# Patient Record
Sex: FEMALE | Race: WHITE | NOT HISPANIC OR LATINO | URBAN - METROPOLITAN AREA
[De-identification: names, ages, dates, MRNs, and addresses within clinical notes are randomized per-mention and may not be internally consistent; named-entity substitution may affect disease eponyms.]

---

## 2019-04-10 ENCOUNTER — INPATIENT (INPATIENT)
Facility: HOSPITAL | Age: 19
LOS: 2 days | Discharge: HOME | End: 2019-04-13
Attending: OBSTETRICS & GYNECOLOGY | Admitting: OBSTETRICS & GYNECOLOGY
Payer: MEDICAID

## 2019-04-11 ENCOUNTER — RESULT REVIEW (OUTPATIENT)
Age: 19
End: 2019-04-11

## 2019-04-11 VITALS
SYSTOLIC BLOOD PRESSURE: 126 MMHG | RESPIRATION RATE: 16 BRPM | DIASTOLIC BLOOD PRESSURE: 77 MMHG | HEART RATE: 96 BPM | TEMPERATURE: 99 F

## 2019-04-11 LAB
AMPHET UR-MCNC: NEGATIVE — SIGNIFICANT CHANGE UP
APPEARANCE UR: ABNORMAL
BACTERIA # UR AUTO: ABNORMAL /HPF
BARBITURATES UR SCN-MCNC: NEGATIVE — SIGNIFICANT CHANGE UP
BASOPHILS # BLD AUTO: 0.02 K/UL — SIGNIFICANT CHANGE UP (ref 0–0.2)
BASOPHILS NFR BLD AUTO: 0.2 % — SIGNIFICANT CHANGE UP (ref 0–1)
BENZODIAZ UR-MCNC: NEGATIVE — SIGNIFICANT CHANGE UP
BILIRUB UR-MCNC: NEGATIVE — SIGNIFICANT CHANGE UP
BLD GP AB SCN SERPL QL: SIGNIFICANT CHANGE UP
BUPRENORPHINE SCREEN, URINE RESULT: NEGATIVE — SIGNIFICANT CHANGE UP
COCAINE METAB.OTHER UR-MCNC: NEGATIVE — SIGNIFICANT CHANGE UP
COLOR SPEC: YELLOW — SIGNIFICANT CHANGE UP
DIFF PNL FLD: ABNORMAL
EOSINOPHIL # BLD AUTO: 0.05 K/UL — SIGNIFICANT CHANGE UP (ref 0–0.7)
EOSINOPHIL NFR BLD AUTO: 0.4 % — SIGNIFICANT CHANGE UP (ref 0–8)
EPI CELLS # UR: ABNORMAL /HPF
GLUCOSE UR QL: NEGATIVE MG/DL — SIGNIFICANT CHANGE UP
HCT VFR BLD CALC: 38.1 % — SIGNIFICANT CHANGE UP (ref 37–47)
HGB BLD-MCNC: 12.3 G/DL — SIGNIFICANT CHANGE UP (ref 12–16)
IMM GRANULOCYTES NFR BLD AUTO: 0.4 % — HIGH (ref 0.1–0.3)
KETONES UR-MCNC: NEGATIVE — SIGNIFICANT CHANGE UP
L&D DRUG SCREEN, URINE: SIGNIFICANT CHANGE UP
LEUKOCYTE ESTERASE UR-ACNC: ABNORMAL
LYMPHOCYTES # BLD AUTO: 29.1 % — SIGNIFICANT CHANGE UP (ref 20.5–51.1)
LYMPHOCYTES # BLD AUTO: 3.29 K/UL — SIGNIFICANT CHANGE UP (ref 1.2–3.4)
MCHC RBC-ENTMCNC: 29.1 PG — SIGNIFICANT CHANGE UP (ref 27–31)
MCHC RBC-ENTMCNC: 32.3 G/DL — SIGNIFICANT CHANGE UP (ref 32–37)
MCV RBC AUTO: 90.3 FL — SIGNIFICANT CHANGE UP (ref 81–99)
METHADONE UR-MCNC: NEGATIVE — SIGNIFICANT CHANGE UP
MONOCYTES # BLD AUTO: 0.72 K/UL — HIGH (ref 0.1–0.6)
MONOCYTES NFR BLD AUTO: 6.4 % — SIGNIFICANT CHANGE UP (ref 1.7–9.3)
NEUTROPHILS # BLD AUTO: 7.17 K/UL — HIGH (ref 1.4–6.5)
NEUTROPHILS NFR BLD AUTO: 63.5 % — SIGNIFICANT CHANGE UP (ref 42.2–75.2)
NITRITE UR-MCNC: NEGATIVE — SIGNIFICANT CHANGE UP
NRBC # BLD: 0 /100 WBCS — SIGNIFICANT CHANGE UP (ref 0–0)
OPIATES UR-MCNC: NEGATIVE — SIGNIFICANT CHANGE UP
OXYCODONE UR-MCNC: NEGATIVE — SIGNIFICANT CHANGE UP
PCP UR-MCNC: NEGATIVE — SIGNIFICANT CHANGE UP
PH UR: 7.5 — SIGNIFICANT CHANGE UP (ref 5–8)
PLATELET # BLD AUTO: 161 K/UL — SIGNIFICANT CHANGE UP (ref 130–400)
PRENATAL SYPHILIS TEST: SIGNIFICANT CHANGE UP
PROPOXYPHENE QUALITATIVE URINE RESULT: NEGATIVE — SIGNIFICANT CHANGE UP
PROT UR-MCNC: ABNORMAL MG/DL
RBC # BLD: 4.22 M/UL — SIGNIFICANT CHANGE UP (ref 4.2–5.4)
RBC # FLD: 13.8 % — SIGNIFICANT CHANGE UP (ref 11.5–14.5)
RBC CASTS # UR COMP ASSIST: ABNORMAL /HPF
SP GR SPEC: 1.01 — SIGNIFICANT CHANGE UP (ref 1.01–1.03)
TYPE + AB SCN PNL BLD: SIGNIFICANT CHANGE UP
UROBILINOGEN FLD QL: 0.2 MG/DL — SIGNIFICANT CHANGE UP (ref 0.2–0.2)
WBC # BLD: 11.29 K/UL — HIGH (ref 4.8–10.8)
WBC # FLD AUTO: 11.29 K/UL — HIGH (ref 4.8–10.8)
WBC UR QL: ABNORMAL /HPF

## 2019-04-11 PROCEDURE — 59514 CESAREAN DELIVERY ONLY: CPT | Mod: U9

## 2019-04-11 PROCEDURE — 88307 TISSUE EXAM BY PATHOLOGIST: CPT | Mod: 26

## 2019-04-11 RX ORDER — IBUPROFEN 200 MG
600 TABLET ORAL EVERY 6 HOURS
Qty: 0 | Refills: 0 | Status: DISCONTINUED | OUTPATIENT
Start: 2019-04-11 | End: 2019-04-13

## 2019-04-11 RX ORDER — DIPHENHYDRAMINE HCL 50 MG
25 CAPSULE ORAL EVERY 6 HOURS
Qty: 0 | Refills: 0 | Status: DISCONTINUED | OUTPATIENT
Start: 2019-04-11 | End: 2019-04-13

## 2019-04-11 RX ORDER — AMPICILLIN TRIHYDRATE 250 MG
2 CAPSULE ORAL EVERY 6 HOURS
Qty: 0 | Refills: 0 | Status: DISCONTINUED | OUTPATIENT
Start: 2019-04-11 | End: 2019-04-12

## 2019-04-11 RX ORDER — NALOXONE HYDROCHLORIDE 4 MG/.1ML
0.1 SPRAY NASAL
Qty: 0 | Refills: 0 | Status: DISCONTINUED | OUTPATIENT
Start: 2019-04-11 | End: 2019-04-13

## 2019-04-11 RX ORDER — ONDANSETRON 8 MG/1
4 TABLET, FILM COATED ORAL EVERY 6 HOURS
Qty: 0 | Refills: 0 | Status: DISCONTINUED | OUTPATIENT
Start: 2019-04-11 | End: 2019-04-13

## 2019-04-11 RX ORDER — MORPHINE SULFATE 50 MG/1
3 CAPSULE, EXTENDED RELEASE ORAL ONCE
Qty: 0 | Refills: 0 | Status: DISCONTINUED | OUTPATIENT
Start: 2019-04-11 | End: 2019-04-13

## 2019-04-11 RX ORDER — LANOLIN
1 OINTMENT (GRAM) TOPICAL
Qty: 0 | Refills: 0 | Status: DISCONTINUED | OUTPATIENT
Start: 2019-04-11 | End: 2019-04-13

## 2019-04-11 RX ORDER — GLYCERIN ADULT
1 SUPPOSITORY, RECTAL RECTAL AT BEDTIME
Qty: 0 | Refills: 0 | Status: DISCONTINUED | OUTPATIENT
Start: 2019-04-11 | End: 2019-04-13

## 2019-04-11 RX ORDER — FENTANYL/BUPIVACAINE/NS/PF 2MCG/ML-.1
250 PLASTIC BAG, INJECTION (ML) INJECTION
Qty: 0 | Refills: 0 | Status: DISCONTINUED | OUTPATIENT
Start: 2019-04-11 | End: 2019-04-13

## 2019-04-11 RX ORDER — SIMETHICONE 80 MG/1
80 TABLET, CHEWABLE ORAL EVERY 4 HOURS
Qty: 0 | Refills: 0 | Status: DISCONTINUED | OUTPATIENT
Start: 2019-04-11 | End: 2019-04-13

## 2019-04-11 RX ORDER — SODIUM CHLORIDE 9 MG/ML
1000 INJECTION, SOLUTION INTRAVENOUS
Qty: 0 | Refills: 0 | Status: DISCONTINUED | OUTPATIENT
Start: 2019-04-11 | End: 2019-04-12

## 2019-04-11 RX ORDER — ACETAMINOPHEN 500 MG
650 TABLET ORAL ONCE
Qty: 0 | Refills: 0 | Status: COMPLETED | OUTPATIENT
Start: 2019-04-11 | End: 2019-04-11

## 2019-04-11 RX ORDER — AMPICILLIN TRIHYDRATE 250 MG
2 CAPSULE ORAL EVERY 6 HOURS
Qty: 0 | Refills: 0 | Status: DISCONTINUED | OUTPATIENT
Start: 2019-04-11 | End: 2019-04-11

## 2019-04-11 RX ORDER — AMPICILLIN TRIHYDRATE 250 MG
2 CAPSULE ORAL ONCE
Qty: 0 | Refills: 0 | Status: COMPLETED | OUTPATIENT
Start: 2019-04-11 | End: 2019-04-11

## 2019-04-11 RX ORDER — OXYCODONE HYDROCHLORIDE 5 MG/1
5 TABLET ORAL
Qty: 0 | Refills: 0 | Status: DISCONTINUED | OUTPATIENT
Start: 2019-04-11 | End: 2019-04-13

## 2019-04-11 RX ORDER — HYDROMORPHONE HYDROCHLORIDE 2 MG/ML
1 INJECTION INTRAMUSCULAR; INTRAVENOUS; SUBCUTANEOUS
Qty: 0 | Refills: 0 | Status: DISCONTINUED | OUTPATIENT
Start: 2019-04-11 | End: 2019-04-13

## 2019-04-11 RX ORDER — OXYTOCIN 10 UNIT/ML
41.67 VIAL (ML) INJECTION
Qty: 20 | Refills: 0 | Status: DISCONTINUED | OUTPATIENT
Start: 2019-04-11 | End: 2019-04-11

## 2019-04-11 RX ORDER — OXYCODONE AND ACETAMINOPHEN 5; 325 MG/1; MG/1
2 TABLET ORAL EVERY 6 HOURS
Qty: 0 | Refills: 0 | Status: DISCONTINUED | OUTPATIENT
Start: 2019-04-11 | End: 2019-04-13

## 2019-04-11 RX ORDER — AMPICILLIN TRIHYDRATE 250 MG
CAPSULE ORAL
Qty: 0 | Refills: 0 | Status: DISCONTINUED | OUTPATIENT
Start: 2019-04-11 | End: 2019-04-11

## 2019-04-11 RX ORDER — KETOROLAC TROMETHAMINE 30 MG/ML
30 SYRINGE (ML) INJECTION ONCE
Qty: 0 | Refills: 0 | Status: DISCONTINUED | OUTPATIENT
Start: 2019-04-11 | End: 2019-04-13

## 2019-04-11 RX ORDER — GENTAMICIN SULFATE 40 MG/ML
80 VIAL (ML) INJECTION EVERY 8 HOURS
Qty: 0 | Refills: 0 | Status: DISCONTINUED | OUTPATIENT
Start: 2019-04-11 | End: 2019-04-11

## 2019-04-11 RX ORDER — SODIUM CHLORIDE 9 MG/ML
1000 INJECTION, SOLUTION INTRAVENOUS ONCE
Qty: 0 | Refills: 0 | Status: DISCONTINUED | OUTPATIENT
Start: 2019-04-11 | End: 2019-04-11

## 2019-04-11 RX ORDER — FERROUS SULFATE 325(65) MG
325 TABLET ORAL DAILY
Qty: 0 | Refills: 0 | Status: DISCONTINUED | OUTPATIENT
Start: 2019-04-11 | End: 2019-04-13

## 2019-04-11 RX ORDER — ENOXAPARIN SODIUM 100 MG/ML
40 INJECTION SUBCUTANEOUS AT BEDTIME
Qty: 0 | Refills: 0 | Status: DISCONTINUED | OUTPATIENT
Start: 2019-04-12 | End: 2019-04-13

## 2019-04-11 RX ORDER — ENOXAPARIN SODIUM 100 MG/ML
40 INJECTION SUBCUTANEOUS ONCE
Qty: 0 | Refills: 0 | Status: COMPLETED | OUTPATIENT
Start: 2019-04-12 | End: 2019-04-12

## 2019-04-11 RX ORDER — GENTAMICIN SULFATE 40 MG/ML
80 VIAL (ML) INJECTION EVERY 8 HOURS
Qty: 0 | Refills: 0 | Status: DISCONTINUED | OUTPATIENT
Start: 2019-04-11 | End: 2019-04-12

## 2019-04-11 RX ORDER — OXYCODONE AND ACETAMINOPHEN 5; 325 MG/1; MG/1
1 TABLET ORAL
Qty: 0 | Refills: 0 | Status: DISCONTINUED | OUTPATIENT
Start: 2019-04-11 | End: 2019-04-13

## 2019-04-11 RX ORDER — OXYTOCIN 10 UNIT/ML
41.67 VIAL (ML) INJECTION
Qty: 20 | Refills: 0 | Status: DISCONTINUED | OUTPATIENT
Start: 2019-04-11 | End: 2019-04-13

## 2019-04-11 RX ORDER — SODIUM CHLORIDE 9 MG/ML
1000 INJECTION, SOLUTION INTRAVENOUS
Qty: 0 | Refills: 0 | Status: DISCONTINUED | OUTPATIENT
Start: 2019-04-11 | End: 2019-04-11

## 2019-04-11 RX ORDER — OXYCODONE HYDROCHLORIDE 5 MG/1
10 TABLET ORAL
Qty: 0 | Refills: 0 | Status: DISCONTINUED | OUTPATIENT
Start: 2019-04-11 | End: 2019-04-13

## 2019-04-11 RX ORDER — CEFAZOLIN SODIUM 1 G
2000 VIAL (EA) INJECTION ONCE
Qty: 0 | Refills: 0 | Status: DISCONTINUED | OUTPATIENT
Start: 2019-04-11 | End: 2019-04-11

## 2019-04-11 RX ORDER — DOCUSATE SODIUM 100 MG
100 CAPSULE ORAL
Qty: 0 | Refills: 0 | Status: DISCONTINUED | OUTPATIENT
Start: 2019-04-11 | End: 2019-04-13

## 2019-04-11 RX ORDER — ACETAMINOPHEN 500 MG
650 TABLET ORAL EVERY 6 HOURS
Qty: 0 | Refills: 0 | Status: DISCONTINUED | OUTPATIENT
Start: 2019-04-11 | End: 2019-04-13

## 2019-04-11 RX ADMIN — Medication 216 GRAM(S): at 18:44

## 2019-04-11 RX ADMIN — Medication 104 MILLIGRAM(S): at 10:28

## 2019-04-11 RX ADMIN — Medication 650 MILLIGRAM(S): at 10:17

## 2019-04-11 RX ADMIN — SODIUM CHLORIDE 125 MILLILITER(S): 9 INJECTION, SOLUTION INTRAVENOUS at 18:10

## 2019-04-11 RX ADMIN — Medication 104 MILLIGRAM(S): at 19:25

## 2019-04-11 RX ADMIN — Medication 100 MILLIGRAM(S): at 17:40

## 2019-04-11 RX ADMIN — ONDANSETRON 4 MILLIGRAM(S): 8 TABLET, FILM COATED ORAL at 15:39

## 2019-04-11 RX ADMIN — Medication 216 GRAM(S): at 10:21

## 2019-04-11 NOTE — BRIEF OPERATIVE NOTE - OPERATION/FINDINGS
Live female infant delivered in cephalic presentation, 40w1d, clear amniontic fluid, APGARs 9/9, bwt 3830g. Normal appearing uterus, fallopian tubes and ovaries bilaterally. 3x4cm hematoma at the right superior edge of the uterine incision, soft and non-expanding. Interceed placed.

## 2019-04-11 NOTE — OB PROVIDER TRIAGE NOTE - NSOBPROVIDERNOTE_OBGYN_ALL_OB_FT
17 yo  at 40w1d GA, GBS negative, in early labor, BPP 10/10, desires ambulation  -ambulate x2 hours, instructions and precautions given  -re-evaluate on return to L&D    Discussed with Dr. Voss and Dr. Fulton

## 2019-04-11 NOTE — OB PROVIDER H&P - ATTENDING COMMENTS
17 yo  at 40w1d GA, GBS negative, in labor, s/p epidural , w/ auscultated fetal arrythmia  Arom done clear fluid.    -follow fhr pattern,   -l lateral position  -oxygen via facemask

## 2019-04-11 NOTE — OB PROVIDER H&P - ASSESSMENT
17 yo  at 40w1d GA, GBS negative, in labor, desiring epidural  Admit to L&D  Continuous EFM and toco  IV fluids  Admission labs  Clear liquid diet  epidural for pain managment    Discussed with Dr. Voss and Dr. Fulton

## 2019-04-11 NOTE — PROGRESS NOTE ADULT - SUBJECTIVE AND OBJECTIVE BOX
Chief Complaint: Postpartum    HPI: Pt doing well, pain well controlled. No acute complaints. NPO for now, not yet ambulating, Addison in place. No flatus or BM yet. Breastfeeding.     ROS: Denies cardiovascular or respiratory symptoms    PAST MEDICAL & SURGICAL HISTORY:  No pertinent past medical history  No significant past surgical history    Physical Exam  Vital Signs Last 24 Hrs  T(F): 97.3 (2019 14:45), Max: 101.3 (2019 10:12)  HR: 86 (2019 14:45) (46 - 106)  BP: 105/62 (2019 14:45) (96/60 - 140/68)  RR: 20 (2019 14:45) (16 - 20)    Physical exam:  General - AAOx3, NAD  Heart - S1S2, RRR  Lungs - CTA BL  Abdomen:  - Soft, nontender, nondistended, BS+, dressing intact, dry and clean   - Fundus firm, moderately tender  Pelvis/Vagina - Normal Lochia  Extremities: No calf tenderness, no swelling bilaterally   Urine output: 50cc/hr ([id number]) - adequate as min 43cc/hr     Labs:                        12.3   11.29 )-----------( 161      ( 2019 03:00 )             38.1     Type + Screen: O POS (19 @ 03:00)  Antibody Screen: NEG (19 @ 03:00)    Prenatal Syphilis Test: Nonreact (19 @ 03:00)    Assessment:   17 yo now , s/p primary  for NRFHR, POD#0, w/ 3x4cm hematoma right superior, w/ chorioamnionitis, temp 101.4F @1012, since then no elevated temps, on amp/gent/clinda now, recovering well,     Plan:  -Routine postpartum care  -Encourage ambulation after Addison comes out  -D/c Addison at around 0300 when duramorph ends or in the AM if UO still adequate  -f/u AM labs  -Lovenox and Ancef as ordered  -Advance diet to clear liquid at around 1900  -Encourage out of bed to chair at around 1900  -Needs PP MMR    Dr. Jacob and Dr. Trujillo to be made aware. Chief Complaint: Postpartum    HPI: Pt doing well, pain well controlled. No acute complaints. NPO for now, not yet ambulating, Addison in place. No flatus or BM yet. Breastfeeding.     ROS: Denies cardiovascular or respiratory symptoms    PAST MEDICAL & SURGICAL HISTORY:  No pertinent past medical history  No significant past surgical history    Physical Exam  Vital Signs Last 24 Hrs  T(F): 97.3 (2019 14:45), Max: 101.3 (2019 10:12)  HR: 86 (2019 14:45) (46 - 106)  BP: 105/62 (2019 14:45) (96/60 - 140/68)  RR: 20 (2019 14:45) (16 - 20)    Physical exam:  General - AAOx3, NAD  Heart - S1S2, RRR  Lungs - CTA BL  Abdomen:  - Soft, nontender, nondistended, BS+, dressing intact, dry and clean   - Fundus firm, moderately tender  Pelvis/Vagina - Normal Lochia  Extremities: No calf tenderness, no swelling bilaterally   Urine output: 50cc/hr ([id number]) - adequate as min 43cc/hr     Labs:                        12.3   11.29 )-----------( 161      ( 2019 03:00 )             38.1     Type + Screen: O POS (19 @ 03:00)  Antibody Screen: NEG (19 @ 03:00)    Prenatal Syphilis Test: Nonreact (19 @ 03:00)    Assessment:   19 yo now , s/p primary  for NRFHR, POD#0, w/ 3x4cm hematoma right superior, w/ chorioamnionitis, temp 101.4F @1012, since then no elevated temps, on amp/gent/clinda now, recovering well,     Plan:  -Routine postpartum care  -Encourage ambulation after Addison comes out  -D/c Addison at around 0300 when duramorph ends or in the AM if UO still adequate  -f/u AM labs  -Lovenox and Ancef as ordered  -Advance diet to clear liquid at around 1900  -Encourage out of bed to chair at around 1900  -Needs PP MMR  -Pain management prn  -Encourage spirometry use     Dr. Jacob and Dr. Trujillo to be made aware.

## 2019-04-11 NOTE — PROCEDURE NOTE - NSLOADDOSE_OBGYN_ALL_OB
10 ML of 0.125 percent Bupivicaine Continuous Bupivicaine 0.1 percent/10 ML of 0.125 percent Bupivicaine

## 2019-04-11 NOTE — OB PROVIDER TRIAGE NOTE - NSHPPHYSICALEXAM_GEN_ALL_CORE
Vital Signs Last 24 Hrs  T(F): 98.6 (11 Apr 2019 00:04), Max: 98.6 (11 Apr 2019 00:04)  HR: 96 (11 Apr 2019 00:08) (96 - 96)  BP: 126/77 (11 Apr 2019 00:08) (126/77 - 126/77)  RR: 16 (11 Apr 2019 00:04) (16 - 16)    EFM: category 1, reactive NST  [place]: q5min    Gen: AAOx3, NAD  Abd: Gravid, soft, nontender, moderate intensity palpable contractions

## 2019-04-11 NOTE — OB PROVIDER H&P - HISTORY OF PRESENT ILLNESS
17 yo  at 40w1d GA by first trimester sono presents for contractions since early afternoon, every 5 minutes, moderate intensity. She denies leakage of fluid or vaginal bleeding. She feels regular fetal movement. Last examined in office this morning, 2cm dilated.    She returned s/p ambulation with increased contraction pain, desiring epidural. She continues to deny leakage of fluid or vaginal bleeding. She still feels regular fetal movement.

## 2019-04-11 NOTE — OB PROVIDER H&P - NSHPPHYSICALEXAM_GEN_ALL_CORE
Vital Signs Last 24 Hrs  T(F): 98.6 (11 Apr 2019 00:04), Max: 98.6 (11 Apr 2019 00:04)  HR: 93 (11 Apr 2019 02:48) (93 - 96)  BP: 131/90 (11 Apr 2019 02:48) (126/77 - 131/90)  RR: 16 (11 Apr 2019 00:04) (16 - 16)    EFM: 140/mod/accels  [place]:q5min    Gen: AAOx3, NAD  Abd: Gravid, soft, nontender, palpable contractions

## 2019-04-11 NOTE — BRIEF OPERATIVE NOTE - NSICDXBRIEFPOSTOP_GEN_ALL_CORE_FT
POST-OP DIAGNOSIS:  Term pregnancy delivered 11-Apr-2019 11:59:23  Xi Garcia  Chorioamnionitis 11-Apr-2019 11:59:16  Xi Garcia  Non-reassuring fetal heart rate with late deceleration 11-Apr-2019 11:59:07  Xi Garcia

## 2019-04-11 NOTE — BRIEF OPERATIVE NOTE - COMMENTS
Uncomplicated    Dictation: [id number] Uncomplicated  patient underwent uncomplicated primary LFT  as above, normal adnexae, tolerated well  Dictation: [id number]

## 2019-04-11 NOTE — PROGRESS NOTE ADULT - SUBJECTIVE AND OBJECTIVE BOX
PGY 3 Note:    Patient with cat 2 tracing, recurrent late decelerations. Patient now with temp 101.4F at 1012, fetal tachycardia, dx with chorioamnionitis now. Discussed primary  section with the patient for Cat 2 tracing, informed consent obtained. Will start amp/gent/clinda now. Will open OR, prep skin, move patient to OR, alert Anesthesia and Peds teams.    Dr. Voss aware PGY 3 Note:    Patient with cat 2 tracing, recurrent late decelerations. Patient now with temp 101.4F at 1012, fetal tachycardia (/mnod/no accels, recurrent late decels), no pitocin was evergiven, dx with chorioamnionitis now, patient still 6cm since 0930, check again at 1020, still 6cm. Discussed primary  section with the patient for Cat 2 tracing remote from delivery started at 0900, informed consent obtained. Will start amp/gent/clinda now. Will open OR, prep skin, move patient to OR, alert Anesthesia and Peds teams.    Dr. Voss aware PGY 3 Note:    Patient with cat 2 tracing, recurrent late decelerations. Patient now with temp 101.4F at 1012, fetal tachycardia (/mnod/no accels, recurrent late decels), no pitocin was evergiven, dx with chorioamnionitis now, patient still 6cm since 0930, check again at 1020, still 6cm. Discussed primary  section with the patient for Cat 2 tracing remote from delivery started at 0900, informed consent obtained. Will start amp/gent/clinda now. Will open OR, prep skin, move patient to OR, alert Anesthesia and Peds teams.    Dr. Voss aware    Ob Attg:   Para 0 at 40.1 wks, in labor, now w/ worsening category 2 fhr tracing, maternal temperature 101.5, remote from delivery. R/B/A discussed w/ patient  Consent signed.  Dr Trujillo now on labor floor will proceed to  section.

## 2019-04-11 NOTE — OB PROVIDER TRIAGE NOTE - HISTORY OF PRESENT ILLNESS
19 yo  at 40w1d GA by LMP presents for contractions since early afternoon, every 5 minutes, moderate intensity. She denies leakage of fluid or vaginal bleeding. She feels regular fetal movement. Last examined in office this morning, 2cm dilated. 17 yo  at 40w1d GA by first trimester sono presents for contractions since early afternoon, every 5 minutes, moderate intensity. She denies leakage of fluid or vaginal bleeding. She feels regular fetal movement. Last examined in office this morning, 2cm dilated.

## 2019-04-11 NOTE — PROCEDURE NOTE - SUPERVISORY STATEMENT
Epidural Infusion:  0.1% Bupivacaine PLAIN Rate:  15 ml/hr  Sensory:  T8 (Rt = Mid = Lt)  Motor intact, able to flex b/l knees w/o any difficulty  Pain pre = "8", po = "2"  Pt does not have any questions.

## 2019-04-11 NOTE — PRE-ANESTHESIA EVALUATION ADULT - NSANTHOSAYNRD_GEN_A_CORE
No. [place] screening performed.  STOP BANG Legend: 0-2 = LOW Risk; 3-4 = INTERMEDIATE Risk; 5-8 = HIGH Risk

## 2019-04-11 NOTE — PROCEDURE NOTE - ADDITIONAL PROCEDURE DETAILS
REDOSE  Pain:  V/E  Time: 0630  Medication: Lidocaine 1.5% 5xx  Given in increments after aspiration  VSS analgesia at T10 REDOSE  Pain:  V/E  Time: 0630    Patient transferred to OR for C/S at 1035  Indication for C/S Cat 2 FHR fetal stress  Medication: Lidocaine 1.5% 5xx  Given in increments after aspiration  VSS analgesia at T10 REDOSE  Pain:  V/E  Time: 0630  Medication: Lidocaine 1.5% 5cc  Given in increments after aspiration  VSS analgesia at T10    Patient transferred to OR for C/S at 1035  Indication for C/S Cat 2 FHR fetal stress

## 2019-04-11 NOTE — CHART NOTE - NSCHARTNOTEFT_GEN_A_CORE
Thursday 04/11/2019  05:26 AM    Patient doing well s/p Labor Epidural.  Hand-off report to be given to AM Anesthesia Team at 06:00 AM.

## 2019-04-11 NOTE — BRIEF OPERATIVE NOTE - NSICDXBRIEFPREOP_GEN_ALL_CORE_FT
PRE-OP DIAGNOSIS:  Term pregnancy 11-Apr-2019 11:59:37  Xi Garcia  Chorioamnionitis 11-Apr-2019 11:58:51  Xi Garcia  Non-reassuring fetal heart rate with late deceleration 11-Apr-2019 11:58:39  Xi Garcia

## 2019-04-11 NOTE — PATIENT PROFILE, NEWBORN NICU. - PRO PRENATAL LABS ORI SOURCE RUBELLA
Contacted [facility] to obtain EEG records.  Stated they would fax the information.      Boone Will  03/18/18 [id number]     hard copy, drawn during this pregnancy

## 2019-04-11 NOTE — OB PROVIDER H&P - NSHPLABSRESULTS_GEN_ALL_CORE
Sonos:  11/26/18 breech, anterior placenta, cervix 3.92cm, MVP 4.1, normal anatomic survey, suboptimal face and cardiac views

## 2019-04-11 NOTE — OB PROVIDER TRIAGE NOTE - NSHPLABSRESULTS_GEN_ALL_CORE
GBS negative 3/7/2019 GBS negative 3/7/2019  GCT 93    Sonos:  11/26/18 breech, anterior placenta, cervix 3.92cm, MVP 4.1, normal anatomic survey, suboptimal face and cardiac views

## 2019-04-11 NOTE — PROCEDURAL SAFETY CHECKLIST WITH OR WITHOUT SEDATION - NSPRESEDATION2FT_GEN_ALL_CORE
Pt presents to ER for evaluation of G-Tube displacement. Patient states that she was seen earlier in the week by her surgeon and was told that if her tube became dislodged that she needed to seek emergency attention. Patient states that she had hiatal hernia surgery and had a tube placed due to this. Patient states that the tube has come loose.    Anesthesia confirms case reviewed for anesthesia risk alert.

## 2019-04-11 NOTE — OB RN INTRAOPERATIVE NOTE - NS_ELECTROPADLOC_OBGYN_ALL_OB
How Severe Are Your Spot(S)?: moderate What Is The Reason For Today's Visit?: Full Body Skin Examination What Is The Reason For Today's Visit? (Being Monitored For X): concerning skin lesions on an annual basis Right thigh

## 2019-04-12 LAB
HCT VFR BLD CALC: 31.2 % — LOW (ref 37–47)
HGB BLD-MCNC: 9.9 G/DL — LOW (ref 12–16)
MCHC RBC-ENTMCNC: 28.9 PG — SIGNIFICANT CHANGE UP (ref 27–31)
MCHC RBC-ENTMCNC: 31.7 G/DL — LOW (ref 32–37)
MCV RBC AUTO: 91 FL — SIGNIFICANT CHANGE UP (ref 81–99)
NRBC # BLD: 0 /100 WBCS — SIGNIFICANT CHANGE UP (ref 0–0)
PLATELET # BLD AUTO: 122 K/UL — LOW (ref 130–400)
RBC # BLD: 3.43 M/UL — LOW (ref 4.2–5.4)
RBC # FLD: 14.4 % — SIGNIFICANT CHANGE UP (ref 11.5–14.5)
SURGICAL PATHOLOGY STUDY: SIGNIFICANT CHANGE UP
WBC # BLD: 14.2 K/UL — HIGH (ref 4.8–10.8)
WBC # FLD AUTO: 14.2 K/UL — HIGH (ref 4.8–10.8)

## 2019-04-12 RX ORDER — SODIUM CHLORIDE 9 MG/ML
3 INJECTION INTRAMUSCULAR; INTRAVENOUS; SUBCUTANEOUS EVERY 8 HOURS
Qty: 0 | Refills: 0 | Status: DISCONTINUED | OUTPATIENT
Start: 2019-04-12 | End: 2019-04-13

## 2019-04-12 RX ADMIN — ENOXAPARIN SODIUM 40 MILLIGRAM(S): 100 INJECTION SUBCUTANEOUS at 21:32

## 2019-04-12 RX ADMIN — Medication 216 GRAM(S): at 06:08

## 2019-04-12 RX ADMIN — Medication 100 MILLIGRAM(S): at 02:43

## 2019-04-12 RX ADMIN — SIMETHICONE 80 MILLIGRAM(S): 80 TABLET, CHEWABLE ORAL at 21:04

## 2019-04-12 RX ADMIN — Medication 216 GRAM(S): at 00:44

## 2019-04-12 RX ADMIN — Medication 600 MILLIGRAM(S): at 06:06

## 2019-04-12 RX ADMIN — Medication 100 MILLIGRAM(S): at 11:18

## 2019-04-12 RX ADMIN — Medication 650 MILLIGRAM(S): at 00:45

## 2019-04-12 RX ADMIN — Medication 600 MILLIGRAM(S): at 16:06

## 2019-04-12 RX ADMIN — Medication 104 MILLIGRAM(S): at 02:42

## 2019-04-12 RX ADMIN — SODIUM CHLORIDE 3 MILLILITER(S): 9 INJECTION INTRAMUSCULAR; INTRAVENOUS; SUBCUTANEOUS at 21:34

## 2019-04-12 RX ADMIN — Medication 650 MILLIGRAM(S): at 02:06

## 2019-04-12 RX ADMIN — Medication 104 MILLIGRAM(S): at 11:19

## 2019-04-12 RX ADMIN — Medication 100 MILLIGRAM(S): at 18:54

## 2019-04-12 RX ADMIN — Medication 600 MILLIGRAM(S): at 22:55

## 2019-04-12 RX ADMIN — ENOXAPARIN SODIUM 40 MILLIGRAM(S): 100 INJECTION SUBCUTANEOUS at 00:45

## 2019-04-12 RX ADMIN — Medication 600 MILLIGRAM(S): at 04:26

## 2019-04-12 NOTE — PROGRESS NOTE ADULT - SUBJECTIVE AND OBJECTIVE BOX
PGY 3 Note:    Pt doing well, pain well controlled. No acute complaints. Denies fever, chills, CP, SOB, severe abdominal pain, heavy vaginal bleeding, swelling.     Ambulating: Yes  Addison catheter in place  Flatus: No  Bowel movements: No  Breast or bottle feeding: Both  Diet: Clears    PAST MEDICAL & SURGICAL HISTORY:  No pertinent past medical history  No significant past surgical history      Physical Exam  Vital Signs Last 24 Hrs  T(F): 96.3 (2019 04:00), Max: 101.3 (2019 10:12)  HR: 91 (2019 04:00) (46 - 106)  BP: 110/61 (2019 04:00) (96/60 - 140/68)  RR: 18 (2019 04:00) (18 - 20)    Gen: AAOx3, NAD  Heart: S1S2, RRR  Lungs: CTABL  Abd: Soft, appropriately tender, mildly distended, BS+  Incision: Dressing in place, no saturation. Steri strips in place, incision c/d/i, no erythema/induration/drainage.  Fundus: Firm, appropriately tender, below the umbilicus  Lochia: Normal  Ext: No calf tenderness, no swelling, SCDs in place    Labs:                        12.3   11.29 )-----------( 161      ( 2019 03:00 )             38.1     Urinalysis Basic - ( 2019 03:00 )    Color: Yellow / Appearance: Cloudy / S.015 / pH: x  Gluc: x / Ketone: Negative  / Bili: Negative / Urobili: 0.2 mg/dL   Blood: x / Protein: Trace mg/dL / Nitrite: Negative   Leuk Esterase: Moderate / RBC: 26-50 /HPF / WBC 10-25 /HPF   Sq Epi: x / Non Sq Epi: Moderate /HPF / Bacteria: Moderate /HPF PGY 3 Note:    Pt doing well, pain well controlled. No acute complaints. Denies fever, chills, CP, SOB, severe abdominal pain, heavy vaginal bleeding, swelling. Denies headache, vision changes, right upper or epigastric abdominal pain, new swelling.    Ambulating: Yes  Addison catheter in place  Flatus: No  Bowel movements: No  Breast or bottle feeding: Both  Diet: Clears    PAST MEDICAL & SURGICAL HISTORY:  No pertinent past medical history  No significant past surgical history      Physical Exam  Vital Signs Last 24 Hrs  T(F): 96.3 (2019 04:00), Max: 101.3 (2019 10:12)  HR: 91 (2019 04:00) (46 - 106)  BP: 110/61 (2019 04:00) (96/60 - 140/68)  RR: 18 (2019 04:00) (18 - 20)    Gen: AAOx3, NAD  Heart: S1S2, RRR  Lungs: CTABL  Abd: Soft, appropriately tender, mildly distended, BS+  Incision: Dressing in place, no saturation. Steri strips in place, incision c/d/i, no erythema/induration/drainage.  Fundus: Firm, appropriately tender, below the umbilicus  Lochia: Normal  Ext: No calf tenderness, no swelling, SCDs in place    Labs:                        12.3   11.29 )-----------( 161      ( 2019 03:00 )             38.1     Urinalysis Basic - ( 2019 03:00 )    Color: Yellow / Appearance: Cloudy / S.015 / pH: x  Gluc: x / Ketone: Negative  / Bili: Negative / Urobili: 0.2 mg/dL   Blood: x / Protein: Trace mg/dL / Nitrite: Negative   Leuk Esterase: Moderate / RBC: 26-50 /HPF / WBC 10-25 /HPF   Sq Epi: x / Non Sq Epi: Moderate /HPF / Bacteria: Moderate /HPF

## 2019-04-12 NOTE — PROGRESS NOTE ADULT - ASSESSMENT
S/p pLTCS POD 1 with chorioamnionitis on amp/gent/clinda, afebrile, GHTN r/o preeclampsia, asymptomatic, doing well    - F/u AM CBC  - Will discussed PELs with PMD  - JOSE RAFAEL Addison DC by 1230  - Advance diet to fulls  - Pain mgt prn  - Cont current mgt  - Encourage oral hydration, ambulation, incentive spirometer use    Will inform Dr. De La Garza S/p pLTCS POD 1 with chorioamnionitis on amp/gent/clinda, small uterine hematoma, afebrile, GHTN r/o preeclampsia, asymptomatic, doing well    - F/u AM CBC  - Will discussed PELs with PMD  - JOSE RAFAEL Addison DC by 1230  - Advance diet to fulls  - Pain mgt prn  - Cont current mgt  - Encourage oral hydration, ambulation, incentive spirometer use    Will inform Dr. De La Garza

## 2019-04-12 NOTE — PROGRESS NOTE ADULT - ATTENDING COMMENTS
s/p c/s, POD#1, doing well  agrre with above manage,ent  d/c abx after 24 hours afebrile  advance diet to regular  f/u voids  f/u cbc s/p c/s, POD#1, doing well  agrre with above manage,ent  d/c abx after 24 hours afebrile  advance diet to regular  f/u voids  f/u cbc  met criteria for ghtn - send PELs

## 2019-04-13 ENCOUNTER — TRANSCRIPTION ENCOUNTER (OUTPATIENT)
Age: 19
End: 2019-04-13

## 2019-04-13 VITALS
TEMPERATURE: 100 F | SYSTOLIC BLOOD PRESSURE: 104 MMHG | DIASTOLIC BLOOD PRESSURE: 58 MMHG | HEART RATE: 93 BPM | RESPIRATION RATE: 20 BRPM

## 2019-04-13 PROCEDURE — 99231 SBSQ HOSP IP/OBS SF/LOW 25: CPT

## 2019-04-13 RX ORDER — SIMETHICONE 80 MG/1
1 TABLET, CHEWABLE ORAL
Qty: 28 | Refills: 0
Start: 2019-04-13 | End: 2019-04-19

## 2019-04-13 RX ORDER — IBUPROFEN 200 MG
1 TABLET ORAL
Qty: 120 | Refills: 1
Start: 2019-04-13 | End: 2019-06-11

## 2019-04-13 RX ORDER — DOCUSATE SODIUM 100 MG
1 CAPSULE ORAL
Qty: 60 | Refills: 0
Start: 2019-04-13 | End: 2019-05-12

## 2019-04-13 RX ADMIN — Medication 100 MILLIGRAM(S): at 02:20

## 2019-04-13 RX ADMIN — Medication 600 MILLIGRAM(S): at 09:28

## 2019-04-13 RX ADMIN — Medication 600 MILLIGRAM(S): at 15:43

## 2019-04-13 RX ADMIN — SIMETHICONE 80 MILLIGRAM(S): 80 TABLET, CHEWABLE ORAL at 09:28

## 2019-04-13 RX ADMIN — SIMETHICONE 80 MILLIGRAM(S): 80 TABLET, CHEWABLE ORAL at 03:10

## 2019-04-13 RX ADMIN — SIMETHICONE 80 MILLIGRAM(S): 80 TABLET, CHEWABLE ORAL at 15:41

## 2019-04-13 RX ADMIN — SODIUM CHLORIDE 3 MILLILITER(S): 9 INJECTION INTRAMUSCULAR; INTRAVENOUS; SUBCUTANEOUS at 06:42

## 2019-04-13 RX ADMIN — Medication 1 TABLET(S): at 11:41

## 2019-04-13 RX ADMIN — OXYCODONE AND ACETAMINOPHEN 1 TABLET(S): 5; 325 TABLET ORAL at 03:01

## 2019-04-13 RX ADMIN — Medication 100 MILLIGRAM(S): at 09:27

## 2019-04-13 RX ADMIN — SODIUM CHLORIDE 3 MILLILITER(S): 9 INJECTION INTRAMUSCULAR; INTRAVENOUS; SUBCUTANEOUS at 13:11

## 2019-04-13 RX ADMIN — Medication 325 MILLIGRAM(S): at 11:41

## 2019-04-13 NOTE — PROGRESS NOTE ADULT - SUBJECTIVE AND OBJECTIVE BOX
Subjective:   Patient doing well. No complaints. Minimal lochia. Pain controlled.    Objective:   T(F): 96.3 ( @ 08:02), Max: 98.9 ( @ 01:23)  HR: 70 ( @ 08:02)  BP: 99/62 ( @ 08:02) (99/62 - 115/52)  RR: 18 ( @ 08:02)  SpO2: --  Gen: AAOx3, NAD  Abd: Soft, Nontender, Nondistended, Fundus firm below the umbilicus  Ext: no tender, mild edema  Min Lochia Rubra    Labs:                        9.9    14.20 )-----------( 122      ( 2019 11:12 )             31.2             Tolerating regular diet  Passed flatus, passed bowel movement  Breast/Bottle feeding    Assessment:   18y s/p , POD#2, doing well    Plan:  -Routine postpartum care  -Encouraged ambulation and PO hydration  -Tolerating regular diet Subjective:   Patient doing well. No complaints. Minimal lochia. Pain controlled.  Passing flatus, voiding freely.    Objective:   T(F): 96.3 ( @ 08:02), Max: 98.9 ( @ 01:23)  HR: 70 ( @ 08:02)  BP: 99/62 ( @ 08:02) (99/62 - 115/52)  RR: 18 ( @ 08:02)  SpO2: --  Gen: AAOx3, NAD  Abd: Soft, Nontender, Nondistended, Fundus firm below the umbilicus  Ext: no tender, mild edema  Min Lochia Rubra    Labs:                        9.9    14.20 )-----------( 122      ( 2019 11:12 )             31.2             Tolerating regular diet  Passed flatus, passed bowel movement  Breast/Bottle feeding    Assessment:   18y s/p , POD#2, doing well    Plan:  -Routine postpartum care  -Encouraged ambulation and PO hydration  -Tolerating regular diet Subjective:   Patient doing well. No complaints. Minimal lochia. Pain controlled.  Passing flatus, voiding freely.    Objective:   T(F): 96.3 ( @ 08:02), Max: 98.9 ( @ 01:23)  HR: 70 ( @ 08:02)  BP: 99/62 ( @ 08:02) (99/62 - 115/52)  RR: 18 ( @ 08:02)  SpO2: --  Gen: AAOx3, NAD  Abd: Soft, Nontender, Nondistended, Fundus firm below the umbilicus  Ext: no tender, mild edema  Min Lochia Rubra    Labs:                        9.9    14.20 )-----------( 122      ( 2019 11:12 )             31.2             Tolerating regular diet  Passed flatus, passed bowel movement  Breast/Bottle feeding    Assessment:   18y s/p primary  for chorio/non reassuring fhr, POD#2, doing well    Plan:  -Routine postpartum care  -Encouraged ambulation and PO hydration  -Tolerating regular diet  -For MMR

## 2019-04-13 NOTE — DISCHARGE NOTE OB - HOSPITAL COURSE
DATE OF DISCHARGE: 19 @ 10:27    HISTORY OF PRESENT ILLNESS/HOSPITAL COURSE: HPI:  19 yo  at 40w1d GA by first trimester sono presents for contractions since early afternoon, every 5 minutes, moderate intensity. She denies leakage of fluid or vaginal bleeding. She feels regular fetal movement. Last examined in office this morning, 2cm dilated.    She returned s/p ambulation with increased contraction pain, desiring epidural. She continues to deny leakage of fluid or vaginal bleeding. She still feels regular fetal movement. (2019 02:56)    PAST MEDICAL & SURGICAL HISTORY:  No pertinent past medical history  No significant past surgical history    Antepartum: Patient with cat 2 tracing, recurrent late decelerations. Patient developed temp 101.4F at 1012, fetal tachycardia (/mnod/no accels, recurrent late decels), no pitocin was evergiven, dx with chorioamnionitis now, patient still 6cm since 0930, check again at 1020, still 6cm. Discussed primary  section with the patient for Cat 2 tracing remote from delivery started at 0900, informed consent obtained. Will start amp/gent/clinda now.     PROCEDURES PERFORMED:  section, uncomplicated      POST PARTUM COURSE: patient met criteria for gHTN, BPs within normal limits. She was on antibiotics and was afebrile. no postoperative complications.   / BP range: 103-113/56-74 4/ BP range: 102-115/51-52. She was discharged home on POD 2. Precautions discussed and given to patient.   LABS:                        9.9    14.20 )-----------( 122      ( 2019 11:12 )             31.2

## 2019-04-13 NOTE — PROGRESS NOTE ADULT - SUBJECTIVE AND OBJECTIVE BOX
DENNISE GILLETTE  18y  Female    PGY1 Note:  Patient seen and examined bedside. No overnight events. Denies heavy vaginal bleeding and reports pain well controlled. Ambulating without difficulty. Tolerating Diet. Reports +Flatus, denies BM.   Denies HA, vision changes, CP, SOB, RUQ pain/ Epigastric pain, N/V, LE pain/ swelling, diarrhea, pain/difficulty with urination, fevers, chills.     MEDICATIONS  (STANDING):  enoxaparin Injectable 40 milliGRAM(s) SubCutaneous at bedtime  fentaNYL (2 MICROgram(s)/mL) + BUpivacaine 0.1%  in Sodium Chloride 0.9% Epidural Drip 250 milliLiter(s) Epidural Drip <Continuous>  ferrous    sulfate 325 milliGRAM(s) Oral daily  measles/mumps/rubella Vaccine 0.5 milliLiter(s) SubCutaneous once  morphine PF Epidural 3 milliGRAM(s) Epidural once  oxytocin Infusion 41.667 milliUNIT(s)/Min (125 mL/Hr) IV Continuous <Continuous>  prenatal multivitamin 1 Tablet(s) Oral daily  sodium chloride 0.9% lock flush 3 milliLiter(s) IV Push every 8 hours    MEDICATIONS  (PRN):  acetaminophen   Tablet .. 650 milliGRAM(s) Oral every 6 hours PRN Temp greater or equal to 38C (100.4F), Mild Pain (1 - 3)  diphenhydrAMINE 25 milliGRAM(s) Oral every 6 hours PRN Itching  docusate sodium 100 milliGRAM(s) Oral two times a day PRN Stool Softening  glycerin Suppository - Adult 1 Suppository(s) Rectal at bedtime PRN Constipation  HYDROmorphone  Injectable 1 milliGRAM(s) IV Push every 3 hours PRN Severe Pain (7 - 10)  ibuprofen  Tablet. 600 milliGRAM(s) Oral every 6 hours PRN Temp greater or equal to 38C (100.4F), Moderate Pain (4 - 6)  ketorolac   Injectable 30 milliGRAM(s) IV Push once PRN Moderate Pain (4 - 6)  lanolin Ointment 1 Application(s) Topical every 3 hours PRN Sore Nipples  naloxone Injectable 0.1 milliGRAM(s) IV Push every 3 minutes PRN For ANY of the following changes in patient status:  A. RR LESS THAN 10 breaths per minute, B. Oxygen saturation LESS THAN 90%, C. Sedation score of 6  naloxone Injectable 0.1 milliGRAM(s) IV Push every 3 minutes PRN For ANY of the following changes in patient status:  A. RR LESS THAN 10 breaths per minute, B. Oxygen saturation LESS THAN 90%, C. Sedation score of 6  ondansetron Injectable 4 milliGRAM(s) IV Push every 6 hours PRN Nausea  ondansetron Injectable 4 milliGRAM(s) IV Push every 6 hours PRN Nausea  oxyCODONE    5 mG/acetaminophen 325 mG 1 Tablet(s) Oral every 3 hours PRN Moderate Pain (4 - 6)  oxyCODONE    5 mG/acetaminophen 325 mG 2 Tablet(s) Oral every 6 hours PRN Severe Pain (7 - 10)  oxyCODONE    IR 5 milliGRAM(s) Oral every 3 hours PRN Mild Pain (1 - 3)  oxyCODONE    IR 10 milliGRAM(s) Oral every 3 hours PRN Moderate Pain (4 - 6)  simethicone 80 milliGRAM(s) Chew every 4 hours PRN Gas      PAST MEDICAL & SURGICAL HISTORY:  No pertinent past medical history  No significant past surgical history      Physical Exam  Vital Signs Last 24 Hrs  T(C): 35.7 (13 Apr 2019 08:02), Max: 37.2 (13 Apr 2019 01:23)  T(F): 96.3 (13 Apr 2019 08:02), Max: 98.9 (13 Apr 2019 01:23)  HR: 70 (13 Apr 2019 08:02) (70 - 115)  BP: 99/62 (13 Apr 2019 08:02) (99/62 - 115/52)  RR: 18 (13 Apr 2019 08:02) (18 - 18)    Gen: AAOx3, NAD  CV: RRR. No murmors gallops or rubs.  Pulm: CTAB. No wheezes or rales.  Ext: No calf tenderness, no swelling.   Abd: Soft, nontender, nondistended, BS+  Fundus firm, and below umbilicus. No tenderness  Lochia: Minimal  Wound: incision clean, dry intact. Steris in place. No surrounding edema or erythema.       Labs:                        9.9    14.20 )-----------( 122      ( 12 Apr 2019 11:12 )             31.2                         12.3   11.29 )-----------( 161      ( 11 Apr 2019 03:00 )             38.1

## 2019-04-13 NOTE — DISCHARGE NOTE OB - CARE PROVIDER_API CALL
Abel Trujillo)  Obstetrics and Gynecology  [address] [place] [address]  Phone: [phone]  Fax: [phone]  Follow Up Time:

## 2019-04-13 NOTE — PROGRESS NOTE ADULT - ASSESSMENT
19yo s/p LTCS POD#2, complicated by chorioamnionitis on amp/gent/clinda, with a small uterine hematoma, afebrile now for 24hrs, and gestational hypertension, blood pressures now well controlled.    -discontinue antibiotics   - encourage ambulation  - PO hydration  - Continue Diet as tolerated  - DVT ppx, lovenox in patient  -Incentive Spirometry bedside  -f/u in 1 week    Dr. Rivero aware, Dr. Zhang to be aware 19yo s/p LTCS POD#2, complicated by chorioamnionitis on amp/gent/clinda, with a small uterine hematoma, afebrile now for 24hrs, and gestational hypertension, blood pressures now well controlled.    -discontinue antibiotics   - encourage ambulation  - PO hydration  - Continue Diet as tolerated  - DVT ppx, lovenox in patient  -Incentive Spirometry bedside  -MMR postpartum   -f/u in 1 week    Dr. Rivero aware Dr. Trujillo aware

## 2019-04-13 NOTE — DISCHARGE NOTE OB - PATIENT PORTAL LINK FT
You can access the [facility] Patient Portal, offered by [facility], by registering with the following website: [email or web address]

## 2019-04-13 NOTE — DISCHARGE NOTE OB - CARE PLAN
Principal Discharge DX:	Delivery by  section of full-term infant  Goal:	healthy mother  Assessment and plan of treatment:	nothing in the vagina for 6 weeks, no tampons, no douching, no baths, no sex. Showers are fine.   Go to the emergency room if you have a temperature greater than 100.4, worsening abdominal pain or increased vaginal bleeding  follow up with your doctor in 1 week for wound check  Secondary Diagnosis:	Gestational hypertension  Goal:	blood pressure check  Secondary Diagnosis:	Chorioamnionitis

## 2019-04-13 NOTE — DISCHARGE NOTE OB - PLAN OF CARE
healthy mother nothing in the vagina for 6 weeks, no tampons, no douching, no baths, no sex. Showers are fine.   Go to the emergency room if you have a temperature greater than 100.4, worsening abdominal pain or increased vaginal bleeding  follow up with your doctor in 1 week for wound check blood pressure check

## 2019-04-13 NOTE — DISCHARGE NOTE OB - MEDICATION SUMMARY - MEDICATIONS TO TAKE
I will START or STAY ON the medications listed below when I get home from the hospital:    ibuprofen 600 mg oral tablet  -- 1 tab(s) by mouth every 6 hours, As needed, Temp greater or equal to 38C (100.4F), Moderate Pain (4 - 6)  -- Indication: For pain    Prenatal Multivitamins with Folic Acid 1 mg oral tablet  -- 1 tab(s) by mouth once a day  -- Indication: For LABOR    docusate sodium 100 mg oral capsule  -- 1 cap(s) by mouth 2 times a day, As needed, Stool Softening  -- Indication: For constipation

## 2019-04-17 DIAGNOSIS — O41.1230 CHORIOAMNIONITIS, THIRD TRIMESTER, NOT APPLICABLE OR UNSPECIFIED: ICD-10-CM

## 2019-04-17 DIAGNOSIS — O36.8330 MATERNAL CARE FOR ABNORMALITIES OF THE FETAL HEART RATE OR RHYTHM, THIRD TRIMESTER, NOT APPLICABLE OR UNSPECIFIED: ICD-10-CM

## 2019-04-17 DIAGNOSIS — Z3A.40 40 WEEKS GESTATION OF PREGNANCY: ICD-10-CM
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
## 2019-04-17 DIAGNOSIS — O99.89 OTHER SPECIFIED DISEASES AND CONDITIONS COMPLICATING PREGNANCY, CHILDBIRTH AND THE PUERPERIUM: ICD-10-CM

## 2019-04-17 DIAGNOSIS — N99.61 INTRAOPERATIVE HEMORRHAGE AND HEMATOMA OF A GENITOURINARY SYSTEM ORGAN OR STRUCTURE COMPLICATING A GENITOURINARY SYSTEM PROCEDURE: ICD-10-CM

## 2020-09-17 ENCOUNTER — ASOB RESULT (OUTPATIENT)
Age: 20
End: 2020-09-17

## 2020-09-17 ENCOUNTER — APPOINTMENT (OUTPATIENT)
Dept: ANTEPARTUM | Facility: CLINIC | Age: 20
End: 2020-09-17
Payer: MEDICAID

## 2020-09-17 PROBLEM — Z78.9 OTHER SPECIFIED HEALTH STATUS: Chronic | Status: ACTIVE | Noted: 2019-04-11

## 2020-09-17 PROCEDURE — 76801 OB US < 14 WKS SINGLE FETUS: CPT

## 2020-09-22 ENCOUNTER — OUTPATIENT (OUTPATIENT)
Dept: OUTPATIENT SERVICES | Facility: HOSPITAL | Age: 20
LOS: 1 days | Discharge: HOME | End: 2020-09-22

## 2020-09-22 DIAGNOSIS — Z11.59 ENCOUNTER FOR SCREENING FOR OTHER VIRAL DISEASES: ICD-10-CM

## 2020-09-25 ENCOUNTER — OUTPATIENT (OUTPATIENT)
Dept: OUTPATIENT SERVICES | Facility: HOSPITAL | Age: 20
LOS: 1 days | Discharge: HOME | End: 2020-09-25
Payer: MEDICAID

## 2020-09-25 ENCOUNTER — RESULT REVIEW (OUTPATIENT)
Age: 20
End: 2020-09-25

## 2020-09-25 VITALS
DIASTOLIC BLOOD PRESSURE: 64 MMHG | WEIGHT: 111.99 LBS | HEIGHT: 71 IN | RESPIRATION RATE: 18 BRPM | OXYGEN SATURATION: 100 % | SYSTOLIC BLOOD PRESSURE: 108 MMHG | TEMPERATURE: 98 F | HEART RATE: 88 BPM

## 2020-09-25 VITALS
RESPIRATION RATE: 18 BRPM | HEART RATE: 72 BPM | OXYGEN SATURATION: 100 % | DIASTOLIC BLOOD PRESSURE: 69 MMHG | SYSTOLIC BLOOD PRESSURE: 104 MMHG

## 2020-09-25 DIAGNOSIS — Z98.891 HISTORY OF UTERINE SCAR FROM PREVIOUS SURGERY: Chronic | ICD-10-CM

## 2020-09-25 PROCEDURE — 88305 TISSUE EXAM BY PATHOLOGIST: CPT | Mod: 26

## 2020-09-25 PROCEDURE — 59820 CARE OF MISCARRIAGE: CPT

## 2020-09-25 RX ORDER — ONDANSETRON 8 MG/1
4 TABLET, FILM COATED ORAL ONCE
Refills: 0 | Status: DISCONTINUED | OUTPATIENT
Start: 2020-09-25 | End: 2020-10-09

## 2020-09-25 RX ORDER — SODIUM CHLORIDE 9 MG/ML
1000 INJECTION, SOLUTION INTRAVENOUS
Refills: 0 | Status: DISCONTINUED | OUTPATIENT
Start: 2020-09-25 | End: 2020-10-09

## 2020-09-25 RX ORDER — OXYTOCIN 10 UNIT/ML
10 VIAL (ML) INJECTION ONCE
Refills: 0 | Status: DISCONTINUED | OUTPATIENT
Start: 2020-09-25 | End: 2020-10-09

## 2020-09-25 RX ORDER — HYDROMORPHONE HYDROCHLORIDE 2 MG/ML
0.5 INJECTION INTRAMUSCULAR; INTRAVENOUS; SUBCUTANEOUS
Refills: 0 | Status: DISCONTINUED | OUTPATIENT
Start: 2020-09-25 | End: 2020-09-25

## 2020-09-25 RX ADMIN — SODIUM CHLORIDE 75 MILLILITER(S): 9 INJECTION, SOLUTION INTRAVENOUS at 13:46

## 2020-09-25 NOTE — BRIEF OPERATIVE NOTE - NSICDXBRIEFPROCEDURE_GEN_ALL_CORE_FT
PROCEDURES:  Dilation and evacuation of uterus using suction curettage 25-Sep-2020 13:40:55  Quiana Garcia

## 2020-09-25 NOTE — H&P PST ADULT - HISTORY OF PRESENT ILLNESS
21yo  ~@12w by LMP, presents for D&C for missed . Patient was seen in the office and found to have a ~9w fetus with no FH. Denies vaginal bleeding, contractions or abdominal cramping. Denies fever, chills, chest pain, SOB, vomiting, LE pain. Complaining of mild nausea, tolerating PO.    OBHx: FT LTCS x1    GynHx: denies h/o ovarian cysts, fibroids, abnormal pap smears, STIs

## 2020-09-25 NOTE — H&P PST ADULT - CONSTITUTIONAL
Well-developed, well nourished Principal Discharge DX:	Pneumonia due to infectious organism, unspecified laterality, unspecified part of lung

## 2020-09-28 LAB — SURGICAL PATHOLOGY STUDY: SIGNIFICANT CHANGE UP

## 2020-09-29 DIAGNOSIS — O02.1 MISSED ABORTION: ICD-10-CM

## 2022-01-06 ENCOUNTER — ASOB RESULT (OUTPATIENT)
Age: 22
End: 2022-01-06

## 2022-01-06 ENCOUNTER — APPOINTMENT (OUTPATIENT)
Dept: ANTEPARTUM | Facility: CLINIC | Age: 22
End: 2022-01-06
Payer: MEDICAID

## 2022-01-06 PROCEDURE — 76811 OB US DETAILED SNGL FETUS: CPT

## 2022-02-25 NOTE — BRIEF OPERATIVE NOTE - ANESTHESIOLOGIST NAME
Medication reconciliation completed by RN. Form and chart forwarded to PCP for signatures.    Tatianna Kirkland RN     Hasham

## 2022-03-25 NOTE — ASU PREOP CHECKLIST - VIA
Quality 226: Preventive Care And Screening: Tobacco Use: Screening And Cessation Intervention: Patient screened for tobacco use and is an ex/non-smoker Detail Level: Detailed Quality 110: Preventive Care And Screening: Influenza Immunization: Influenza Immunization Administered during Influenza season ambulate

## 2022-04-21 ENCOUNTER — ASOB RESULT (OUTPATIENT)
Age: 22
End: 2022-04-21

## 2022-04-21 ENCOUNTER — APPOINTMENT (OUTPATIENT)
Dept: ANTEPARTUM | Facility: CLINIC | Age: 22
End: 2022-04-21
Payer: MEDICAID

## 2022-04-21 PROCEDURE — 76817 TRANSVAGINAL US OBSTETRIC: CPT

## 2022-04-21 PROCEDURE — 76816 OB US FOLLOW-UP PER FETUS: CPT

## 2022-05-31 ENCOUNTER — TRANSCRIPTION ENCOUNTER (OUTPATIENT)
Age: 22
End: 2022-05-31

## 2022-05-31 ENCOUNTER — INPATIENT (INPATIENT)
Facility: HOSPITAL | Age: 22
LOS: 1 days | Discharge: HOME | End: 2022-06-02
Attending: STUDENT IN AN ORGANIZED HEALTH CARE EDUCATION/TRAINING PROGRAM | Admitting: STUDENT IN AN ORGANIZED HEALTH CARE EDUCATION/TRAINING PROGRAM
Payer: MEDICAID

## 2022-05-31 VITALS
RESPIRATION RATE: 20 BRPM | SYSTOLIC BLOOD PRESSURE: 126 MMHG | TEMPERATURE: 98 F | WEIGHT: 149.91 LBS | DIASTOLIC BLOOD PRESSURE: 74 MMHG | HEIGHT: 61 IN | HEART RATE: 85 BPM

## 2022-05-31 DIAGNOSIS — Z98.891 HISTORY OF UTERINE SCAR FROM PREVIOUS SURGERY: Chronic | ICD-10-CM

## 2022-05-31 LAB
BASOPHILS # BLD AUTO: 0.02 K/UL — SIGNIFICANT CHANGE UP (ref 0–0.2)
BASOPHILS NFR BLD AUTO: 0.2 % — SIGNIFICANT CHANGE UP (ref 0–1)
BLD GP AB SCN SERPL QL: SIGNIFICANT CHANGE UP
EOSINOPHIL # BLD AUTO: 0.01 K/UL — SIGNIFICANT CHANGE UP (ref 0–0.7)
EOSINOPHIL NFR BLD AUTO: 0.1 % — SIGNIFICANT CHANGE UP (ref 0–8)
HCT VFR BLD CALC: 35 % — LOW (ref 37–47)
HGB BLD-MCNC: 11.4 G/DL — LOW (ref 12–16)
IMM GRANULOCYTES NFR BLD AUTO: 0.4 % — HIGH (ref 0.1–0.3)
LYMPHOCYTES # BLD AUTO: 1.8 K/UL — SIGNIFICANT CHANGE UP (ref 1.2–3.4)
LYMPHOCYTES # BLD AUTO: 17.6 % — LOW (ref 20.5–51.1)
MCHC RBC-ENTMCNC: 29 PG — SIGNIFICANT CHANGE UP (ref 27–31)
MCHC RBC-ENTMCNC: 32.6 G/DL — SIGNIFICANT CHANGE UP (ref 32–37)
MCV RBC AUTO: 89.1 FL — SIGNIFICANT CHANGE UP (ref 81–99)
MONOCYTES # BLD AUTO: 0.6 K/UL — SIGNIFICANT CHANGE UP (ref 0.1–0.6)
MONOCYTES NFR BLD AUTO: 5.9 % — SIGNIFICANT CHANGE UP (ref 1.7–9.3)
NEUTROPHILS # BLD AUTO: 7.73 K/UL — HIGH (ref 1.4–6.5)
NEUTROPHILS NFR BLD AUTO: 75.8 % — HIGH (ref 42.2–75.2)
NRBC # BLD: 0 /100 WBCS — SIGNIFICANT CHANGE UP (ref 0–0)
PLATELET # BLD AUTO: 149 K/UL — SIGNIFICANT CHANGE UP (ref 130–400)
PRENATAL SYPHILIS TEST: SIGNIFICANT CHANGE UP
RBC # BLD: 3.93 M/UL — LOW (ref 4.2–5.4)
RBC # FLD: 13.8 % — SIGNIFICANT CHANGE UP (ref 11.5–14.5)
SARS-COV-2 RNA SPEC QL NAA+PROBE: SIGNIFICANT CHANGE UP
WBC # BLD: 10.2 K/UL — SIGNIFICANT CHANGE UP (ref 4.8–10.8)
WBC # FLD AUTO: 10.2 K/UL — SIGNIFICANT CHANGE UP (ref 4.8–10.8)

## 2022-05-31 PROCEDURE — 59514 CESAREAN DELIVERY ONLY: CPT | Mod: U9

## 2022-05-31 RX ORDER — OXYTOCIN 10 UNIT/ML
333.33 VIAL (ML) INJECTION
Qty: 20 | Refills: 0 | Status: DISCONTINUED | OUTPATIENT
Start: 2022-05-31 | End: 2022-06-02

## 2022-05-31 RX ORDER — LANOLIN
1 OINTMENT (GRAM) TOPICAL EVERY 6 HOURS
Refills: 0 | Status: DISCONTINUED | OUTPATIENT
Start: 2022-05-31 | End: 2022-06-02

## 2022-05-31 RX ORDER — NALOXONE HYDROCHLORIDE 4 MG/.1ML
0.1 SPRAY NASAL
Refills: 0 | Status: DISCONTINUED | OUTPATIENT
Start: 2022-05-31 | End: 2022-06-02

## 2022-05-31 RX ORDER — CEFAZOLIN SODIUM 1 G
2000 VIAL (EA) INJECTION ONCE
Refills: 0 | Status: COMPLETED | OUTPATIENT
Start: 2022-05-31 | End: 2022-05-31

## 2022-05-31 RX ORDER — AZITHROMYCIN 500 MG/1
500 TABLET, FILM COATED ORAL ONCE
Refills: 0 | Status: COMPLETED | OUTPATIENT
Start: 2022-05-31 | End: 2022-05-31

## 2022-05-31 RX ORDER — SODIUM CHLORIDE 9 MG/ML
1000 INJECTION, SOLUTION INTRAVENOUS
Refills: 0 | Status: DISCONTINUED | OUTPATIENT
Start: 2022-05-31 | End: 2022-05-31

## 2022-05-31 RX ORDER — MORPHINE SULFATE 50 MG/1
2 CAPSULE, EXTENDED RELEASE ORAL ONCE
Refills: 0 | Status: DISCONTINUED | OUTPATIENT
Start: 2022-05-31 | End: 2022-06-02

## 2022-05-31 RX ORDER — INFLUENZA VIRUS VACCINE 15; 15; 15; 15 UG/.5ML; UG/.5ML; UG/.5ML; UG/.5ML
0.5 SUSPENSION INTRAMUSCULAR ONCE
Refills: 0 | Status: COMPLETED | OUTPATIENT
Start: 2022-05-31 | End: 2022-05-31

## 2022-05-31 RX ORDER — DEXAMETHASONE 0.5 MG/5ML
4 ELIXIR ORAL EVERY 6 HOURS
Refills: 0 | Status: DISCONTINUED | OUTPATIENT
Start: 2022-05-31 | End: 2022-06-02

## 2022-05-31 RX ORDER — TETANUS TOXOID, REDUCED DIPHTHERIA TOXOID AND ACELLULAR PERTUSSIS VACCINE, ADSORBED 5; 2.5; 8; 8; 2.5 [IU]/.5ML; [IU]/.5ML; UG/.5ML; UG/.5ML; UG/.5ML
0.5 SUSPENSION INTRAMUSCULAR ONCE
Refills: 0 | Status: DISCONTINUED | OUTPATIENT
Start: 2022-05-31 | End: 2022-06-02

## 2022-05-31 RX ORDER — FENTANYL/BUPIVACAINE/NS/PF 2MCG/ML-.1
250 PLASTIC BAG, INJECTION (ML) INJECTION
Refills: 0 | Status: DISCONTINUED | OUTPATIENT
Start: 2022-05-31 | End: 2022-06-02

## 2022-05-31 RX ORDER — CITRIC ACID/SODIUM CITRATE 300-500 MG
15 SOLUTION, ORAL ORAL EVERY 6 HOURS
Refills: 0 | Status: DISCONTINUED | OUTPATIENT
Start: 2022-05-31 | End: 2022-05-31

## 2022-05-31 RX ORDER — KETOROLAC TROMETHAMINE 30 MG/ML
30 SYRINGE (ML) INJECTION EVERY 6 HOURS
Refills: 0 | Status: DISCONTINUED | OUTPATIENT
Start: 2022-05-31 | End: 2022-06-01

## 2022-05-31 RX ORDER — OXYCODONE HYDROCHLORIDE 5 MG/1
5 TABLET ORAL ONCE
Refills: 0 | Status: DISCONTINUED | OUTPATIENT
Start: 2022-05-31 | End: 2022-06-02

## 2022-05-31 RX ORDER — SODIUM CHLORIDE 9 MG/ML
1000 INJECTION, SOLUTION INTRAVENOUS
Refills: 0 | Status: DISCONTINUED | OUTPATIENT
Start: 2022-05-31 | End: 2022-06-02

## 2022-05-31 RX ORDER — ACETAMINOPHEN 500 MG
975 TABLET ORAL
Refills: 0 | Status: DISCONTINUED | OUTPATIENT
Start: 2022-05-31 | End: 2022-06-02

## 2022-05-31 RX ORDER — SENNA PLUS 8.6 MG/1
2 TABLET ORAL AT BEDTIME
Refills: 0 | Status: DISCONTINUED | OUTPATIENT
Start: 2022-05-31 | End: 2022-06-02

## 2022-05-31 RX ORDER — DIPHENHYDRAMINE HCL 50 MG
25 CAPSULE ORAL EVERY 6 HOURS
Refills: 0 | Status: DISCONTINUED | OUTPATIENT
Start: 2022-05-31 | End: 2022-06-02

## 2022-05-31 RX ORDER — OXYCODONE HYDROCHLORIDE 5 MG/1
5 TABLET ORAL
Refills: 0 | Status: DISCONTINUED | OUTPATIENT
Start: 2022-05-31 | End: 2022-06-02

## 2022-05-31 RX ORDER — ONDANSETRON 8 MG/1
4 TABLET, FILM COATED ORAL EVERY 6 HOURS
Refills: 0 | Status: DISCONTINUED | OUTPATIENT
Start: 2022-05-31 | End: 2022-06-02

## 2022-05-31 RX ORDER — ACETAMINOPHEN 500 MG
1000 TABLET ORAL ONCE
Refills: 0 | Status: DISCONTINUED | OUTPATIENT
Start: 2022-05-31 | End: 2022-06-02

## 2022-05-31 RX ORDER — SIMETHICONE 80 MG/1
80 TABLET, CHEWABLE ORAL EVERY 4 HOURS
Refills: 0 | Status: DISCONTINUED | OUTPATIENT
Start: 2022-05-31 | End: 2022-06-02

## 2022-05-31 RX ORDER — IBUPROFEN 200 MG
600 TABLET ORAL EVERY 6 HOURS
Refills: 0 | Status: COMPLETED | OUTPATIENT
Start: 2022-05-31 | End: 2023-04-29

## 2022-05-31 RX ORDER — ENOXAPARIN SODIUM 100 MG/ML
40 INJECTION SUBCUTANEOUS EVERY 24 HOURS
Refills: 0 | Status: DISCONTINUED | OUTPATIENT
Start: 2022-06-01 | End: 2022-06-02

## 2022-05-31 RX ORDER — MAGNESIUM HYDROXIDE 400 MG/1
30 TABLET, CHEWABLE ORAL
Refills: 0 | Status: DISCONTINUED | OUTPATIENT
Start: 2022-05-31 | End: 2022-06-02

## 2022-05-31 RX ADMIN — AZITHROMYCIN 255 MILLIGRAM(S): 500 TABLET, FILM COATED ORAL at 18:13

## 2022-05-31 RX ADMIN — Medication 975 MILLIGRAM(S): at 22:30

## 2022-05-31 RX ADMIN — Medication 100 MILLIGRAM(S): at 17:32

## 2022-05-31 RX ADMIN — SENNA PLUS 2 TABLET(S): 8.6 TABLET ORAL at 21:53

## 2022-05-31 RX ADMIN — Medication 15 MILLILITER(S): at 19:14

## 2022-05-31 RX ADMIN — SIMETHICONE 80 MILLIGRAM(S): 80 TABLET, CHEWABLE ORAL at 21:52

## 2022-05-31 RX ADMIN — Medication 30 MILLIGRAM(S): at 23:29

## 2022-05-31 RX ADMIN — Medication 975 MILLIGRAM(S): at 21:53

## 2022-05-31 NOTE — OB PROVIDER H&P - HISTORY OF PRESENT ILLNESS
22yo  at 40w6d GA, EDC 22 by LMP c/w 1st trimester sonogram presenting to L&D for contractions, q5mins, 6/10 in intensity. Denies LOF, VB. Good fetal movement. No complications this pregnancy. h/o LTCS at term for arrest of dilation at 6cm, desires to TOLAC. GBS neg

## 2022-05-31 NOTE — OB PROVIDER H&P - ATTENDING COMMENTS
22yo  at 40w6d GA, GBS neg, in active labor desiring TOLAC  -admit to L&D  -continuous EFM and toco  -vitals and labs  -pain management PRN  -IVF hydration  -clear liquid diet  -r/b/a of proceeding with TOLAC discussed with patient including increased risk of uterine rupture. Patient strongly motivated for vaginal delivery. Given normal fetal heart tracing will proceed with TOLAC    EFM: cat 1  [place]; ctx q5-6min  EFW 3300g and pelvis adequate

## 2022-05-31 NOTE — PROGRESS NOTE ADULT - SUBJECTIVE AND OBJECTIVE BOX
Assessed patient for category 2 tracing with early and late decelerations. Reports pain resolved with epidural top off. Contractions continue every 3 min. Discussed with the patient again need for  section at this time, still refuses stating she and her  would like to wait further for cervical change until 530pm and if still arrested would consider  section Assessed patient for category 2 tracing with early and late decelerations. Reports pain resolved with epidural top off. Contractions continue every 3 min. Discussed with the patient again need for  section at this time, still refuses stating she and her  would like to wait further for cervical change until 530pm and if still arrested would consider  section. Tracing improved with intrauterine resuscitation - patient put in high fowlers with improved tracing

## 2022-05-31 NOTE — PROGRESS NOTE ADULT - SUBJECTIVE AND OBJECTIVE BOX
Patient seen at bedside, s/p epidural with FHR deceleration.    T(C): 36.4 (22 @ 12:15), Max: 36.4 (22 @ 11:01)  HR: 89 (22 @ 13:34) (71 - 102)  BP: 105/56 (22 @ 13:26) (88/49 - 126/74)  RR: 20 (22 @ 12:15) (20 - 20)  SpO2: 100% (22 @ 13:29) (98% - 100%)  EFM: FHR before:            Prolonged deceleration for 5 minutes. Patient positioned in left lateral position, O2 given by facemask, IV hydration.  FHR was recovered to 130 bpm  [place]: q 2-3 mins  SVE: 6-7/90/-1, IUPC placed without difficulty    citric acid/sodium citrate Solution 15 milliLiter(s) Oral every 6 hours  dexAMETHasone  Injectable 4 milliGRAM(s) IV Push every 6 hours PRN  fentanyl (2 MICROgram(s)/mL) + bupivacaine 0.0625%  in 0.9% Sodium Chloride PCEA 250 milliLiter(s) Epidural PCA Continuous  influenza   Vaccine 0.5 milliLiter(s) IntraMuscular once  lactated ringers. 1000 milliLiter(s) IV Continuous <Continuous>  naloxone Injectable 0.1 milliGRAM(s) IV Push every 3 minutes PRN  ondansetron Injectable 4 milliGRAM(s) IV Push every 6 hours PRN  oxytocin Infusion 333.333 milliUNIT(s)/Min IV Continuous <Continuous>          A/P: 20yo  at 40w6d GA, GBS neg, in active labor desiring TOLAC    -Continue EFM/TOCO  -Continue w/ current resuscitation   -Continue IV hydration  -Epidural in place  -Pain Management PRN    Dr. Deborah calles

## 2022-05-31 NOTE — PROGRESS NOTE ADULT - SUBJECTIVE AND OBJECTIVE BOX
Patient reassessed again for category 2 tracing. Exam remains unchanged and worsening cat 2 tracing, patient counseled on urgent need for c/s. Patient now agrees, informed consetn obtained and all questions answered.    PLan  - abx  - NPO  - on call tok OR  - anethseisa aware

## 2022-05-31 NOTE — OB PROVIDER H&P - NSINFECTIONS_OBGYN_ALL_OB
None Heparin for DVT prophylaxis. Heparin for DVT prophylaxis. Heparin for DVT prophylaxis. Heparin for DVT prophylaxis. Heparin for DVT prophylaxis. Heparin for DVT prophylaxis. Heparin for DVT prophylaxis. Heparin for DVT prophylaxis. Heparin for DVT prophylaxis.

## 2022-05-31 NOTE — OB PROVIDER DELIVERY SUMMARY - NSSELHIDDEN_OBGYN_ALL_OB_FT
[NS_DeliveryAttending1_OBGYN_ALL_OB_FT:[id number]],[NS_DeliveryAssist1_OBGYN_ALL_OB_FT:[id number]=],[NS_DeliveryRN_OBGYN_ALL_OB_FT:[email or web address]]

## 2022-05-31 NOTE — OB PROVIDER H&P - VDRL/RPR: DATE, OB PROFILE
Patient is requesting lab order be placed. She would rather not have an appointment with provider due to cost and poor insurance.   04-May-2022

## 2022-05-31 NOTE — BRIEF OPERATIVE NOTE - NSICDXBRIEFPREOP_GEN_ALL_CORE_FT
PRE-OP DIAGNOSIS:  Arrest of dilation, delivered, current hospitalization 31-May-2022 18:58:55  Quiana Garcia

## 2022-05-31 NOTE — OB PROVIDER H&P - NSHPLABSRESULTS_GEN_ALL_CORE
GCT: 45    Sonogram:   @35w1d: vtx, posterior palcenta, no previa, MVP: 5.18cm, BPP: 8/8, EFW: 2639gms(51%), NL anatomy limited views of heart  @20w1d: breech, posterior placenta, low lying placenta, AFV: NL, EFW: 327gms, limited views of heart and palate and kidneys

## 2022-05-31 NOTE — PROCEDURE NOTE - ADDITIONAL PROCEDURE DETAILS
Thorough discussion of patient's history, as indicated above.  Discussed risks of epidural, including PDPH, inadequate analgesia occasionally requiring epidural catheter replacement, bleeding, infection and spinal cord injury.  Patient expressed understanding of these risks, signed informed consent and wishes to proceed with epidural catheter insertion.  Lumbar epidural performed at L3-4. Standard ASA monitors including BP, pulse oximetry, FHR. Sterile gloves, chlorhexidine prep. 1% lidocaine for local infiltration. 17g Touhy. MIKE to saline at 4.5 cm.  27G Lelo spinal needle inserted through epidural needle.  +CSF/Negative heme or paresthesias.  1cc of 0.25% bupivacaine injected easily.  Lelo needle removed.  Epidural catheter threaded easily to 10 cm. Touhy needle removed. Catheter secured in place. Aspiration negative for blood/CSF. Test dose consisting of 3ml 1.5% lidocaine with epinephrine was negative. Patient tolerated procedure, was hemodynamically stable throughout and did not complain of pain or paresthesias. T10 level bilaterally. Epidural PCEA infusion consisting of 250ml 0.0625% bupivacaine with fentanyl 2mcg/ml initiated. Patient instructed to use PCEA but for pain control as needed. Thorough discussion of patient's history, as indicated above.  Discussed risks of epidural, including PDPH, inadequate analgesia occasionally requiring epidural catheter replacement, bleeding, infection and spinal cord injury.  Patient expressed understanding of these risks, signed informed consent and wishes to proceed with epidural catheter insertion.  Lumbar epidural performed at L3-4. Standard ASA monitors including BP, pulse oximetry, FHR. Sterile gloves, chlorhexidine prep. 1% lidocaine for local infiltration. 17g Touhy. MIKE to saline at 4.5 cm.  27G Lelo spinal needle inserted through epidural needle.  +CSF/Negative heme or paresthesias.  1cc of 0.25% bupivacaine injected easily.  Lelo needle removed.  Epidural catheter threaded easily to 10 cm. Touhy needle removed. Catheter secured in place. Aspiration negative for blood/CSF. Test dose consisting of 3ml 1.5% lidocaine with epinephrine was negative. Patient tolerated procedure, was hemodynamically stable throughout and did not complain of pain or paresthesias. T10 level bilaterally. Epidural PCEA infusion consisting of 250ml 0.0625% bupivacaine with fentanyl 2mcg/ml initiated. Patient instructed to use PCEA but for pain control as needed.    1600:  VSS post epidural placement  Analgesia at T10 R=L  FH variable decel with return to baseline Thorough discussion of patient's history, as indicated above.  Discussed risks of epidural, including PDPH, inadequate analgesia occasionally requiring epidural catheter replacement, bleeding, infection and spinal cord injury.  Patient expressed understanding of these risks, signed informed consent and wishes to proceed with epidural catheter insertion.  Lumbar epidural performed at L3-4. Standard ASA monitors including BP, pulse oximetry, FHR. Sterile gloves, chlorhexidine prep. 1% lidocaine for local infiltration. 17g Touhy. MIKE to saline at 4.5 cm.  27G Lelo spinal needle inserted through epidural needle.  +CSF/Negative heme or paresthesias.  1cc of 0.25% bupivacaine injected easily.  Lelo needle removed.  Epidural catheter threaded easily to 10 cm. Touhy needle removed. Catheter secured in place. Aspiration negative for blood/CSF. Test dose consisting of 3ml 1.5% lidocaine with epinephrine was negative. Patient tolerated procedure, was hemodynamically stable throughout and did not complain of pain or paresthesias. T10 level bilaterally. Epidural PCEA infusion consisting of 250ml 0.0625% bupivacaine with fentanyl 2mcg/ml initiated. Patient instructed to use PCEA but for pain control as needed.    1600:  VSS post epidural placement  Analgesia at T10 R=L  FH variable decel with return to baseline      Patient transferred to OR for C/S at 1735  Indication for C/S:  Cat 2 FHR  JOHNNY in sit and working well  Epidural to be redosed fpr C/S  Pt agrees

## 2022-05-31 NOTE — OB RN DELIVERY SUMMARY - NSSELHIDDEN_OBGYN_ALL_OB_FT
[NS_DeliveryAttending1_OBGYN_ALL_OB_FT:[id number]],[NS_DeliveryAssist1_OBGYN_ALL_OB_FT:[id number]],[NS_DeliveryRN_OBGYN_ALL_OB_FT:[email or web address]=]

## 2022-05-31 NOTE — PROGRESS NOTE ADULT - SUBJECTIVE AND OBJECTIVE BOX
LATE ENTRY 345pm    Patient examined for progress of labor. 4h since last exam, patient margarita well, IUPC in place.    VE: 6-/-1  EFM: cat 1  [place]: ctx q2-3min    Plan  - IVF/cld  - efm/toco  - discussed with the patient regarding lack of cervical change and diagnosis of arrest of stage 1 labor; ctx adequate on IUPC with 4h of no change. Patient admantly declines  section at this time stating with tracing reassuring she would wait longer refuses . discussed R/B/A of  section vs waiting for progress, patient stated understanding refuses cesaren still

## 2022-05-31 NOTE — BRIEF OPERATIVE NOTE - NSICDXBRIEFPOSTOP_GEN_ALL_CORE_FT
POST-OP DIAGNOSIS:  Arrest of dilation, delivered, current hospitalization 31-May-2022 18:58:57  Quiana Garcia

## 2022-05-31 NOTE — OB RN INTRAOPERATIVE NOTE - NSSELHIDDEN_OBGYN_ALL_OB_FT
[NS_DeliveryAttending1_OBGYN_ALL_OB_FT:[id number]],[NS_DeliveryAssist1_OBGYN_ALL_OB_FT:[id number]],[NS_DeliveryRN_OBGYN_ALL_OB_FT:[email or web address]=]

## 2022-05-31 NOTE — PROGRESS NOTE ADULT - SUBJECTIVE AND OBJECTIVE BOX
Patient assessed for progress of labor and pain. Denies VB/LOF.    VE: 6-7/90/-2, AROM meconium  EFM: cat 1  [place]; ctx q5-6min    Plan  - efm/toco  - IVF/CLD  - pain control prn  - anticipate vaginal delivery Patient assessed for progress of labor and pain. Denies VB/LOF.    Discussed with patinet preference for epidural given TOLAC in case of emergent  section. At this time patient refuses pain control.    VE: 6-/-2, AROM meconium  EFM: cat 1  [place]; ctx q5-6min    Plan  - efm/toco  - IVF/CLD  - pain control prn  - anticipate vaginal delivery

## 2022-05-31 NOTE — BRIEF OPERATIVE NOTE - OPERATION/FINDINGS
Viable male infant delivered in vertex position weighing 4070g, APGARs 9/9. Normal uterus, tubes and ovaries bilaterally.

## 2022-05-31 NOTE — OB PROVIDER H&P - ASSESSMENT
22yo  at 40w6d GA, GBS neg, in active labor desiring TOLAC  -admit to L&D  -continuous EFM and toco  -vitals and labs  -pain management PRN  -IVF hydration  -clear liquid diet  -r/b/a of proceeding with TOLAC discussed with patient including increased risk of uterine rupture. Patient strongly motivated for vaginal delivery. Given normal fetal heart tracing will proceed with TOLAC  -re-evaluate    Dr. Cabrera and Dr. Schuster aware

## 2022-05-31 NOTE — OB PROVIDER H&P - NSHPPHYSICALEXAM_GEN_ALL_CORE
Gen: A+OX3. NAD  Abd: Soft, Nontender. Gravid. palpable contractions  FHR: 125bpm/moderate variability/+accelerations/no decelerations  TOCO: q5mins  SVE: 6/90/-2 vtx intact  BSS: cephalic    EFW by [place]: 3200gms

## 2022-05-31 NOTE — OB RN PATIENT PROFILE - NS_OBGYNHISTORY_OBGYN_ALL_OB_FT
OB Hx:    FT LTCS arrest of dilation at 6cm, 8lbd 7oz, no complications  SABx2 D+C x2     Gyn Hx: denies abnormal papsmears, fibroids, cysts, stds

## 2022-05-31 NOTE — CHART NOTE - NSCHARTNOTEFT_GEN_A_CORE
PGY1 Note    Patient seen at bedside for fetal late decelerations note on EFM. Patient repositioned from right lateral to high fowlers position. She continues to receive inhaled oxygen through nonrebreather mask. Patient counselled on repeat  section for category II tracing remote from delivery, patient still strongly desiring TOLAC. Patient desiring to wait until 1730 to make final decision, but understands the importance of  section in an emergent situation.     EFM 140BPM/mod melissa/late decelerations  TOCO q2-6min      Dr. Cabrera and Dr. Smith to be aware

## 2022-06-01 ENCOUNTER — TRANSCRIPTION ENCOUNTER (OUTPATIENT)
Age: 22
End: 2022-06-01

## 2022-06-01 LAB
BASOPHILS # BLD AUTO: 0.02 K/UL — SIGNIFICANT CHANGE UP (ref 0–0.2)
BASOPHILS NFR BLD AUTO: 0.2 % — SIGNIFICANT CHANGE UP (ref 0–1)
EOSINOPHIL # BLD AUTO: 0 K/UL — SIGNIFICANT CHANGE UP (ref 0–0.7)
EOSINOPHIL NFR BLD AUTO: 0 % — SIGNIFICANT CHANGE UP (ref 0–8)
HCT VFR BLD CALC: 28.8 % — LOW (ref 37–47)
HGB BLD-MCNC: 9.3 G/DL — LOW (ref 12–16)
IMM GRANULOCYTES NFR BLD AUTO: 0.4 % — HIGH (ref 0.1–0.3)
LYMPHOCYTES # BLD AUTO: 1.71 K/UL — SIGNIFICANT CHANGE UP (ref 1.2–3.4)
LYMPHOCYTES # BLD AUTO: 14.1 % — LOW (ref 20.5–51.1)
MCHC RBC-ENTMCNC: 29.2 PG — SIGNIFICANT CHANGE UP (ref 27–31)
MCHC RBC-ENTMCNC: 32.3 G/DL — SIGNIFICANT CHANGE UP (ref 32–37)
MCV RBC AUTO: 90.6 FL — SIGNIFICANT CHANGE UP (ref 81–99)
MONOCYTES # BLD AUTO: 1.31 K/UL — HIGH (ref 0.1–0.6)
MONOCYTES NFR BLD AUTO: 10.8 % — HIGH (ref 1.7–9.3)
NEUTROPHILS # BLD AUTO: 9.03 K/UL — HIGH (ref 1.4–6.5)
NEUTROPHILS NFR BLD AUTO: 74.5 % — SIGNIFICANT CHANGE UP (ref 42.2–75.2)
NRBC # BLD: 0 /100 WBCS — SIGNIFICANT CHANGE UP (ref 0–0)
PLATELET # BLD AUTO: 152 K/UL — SIGNIFICANT CHANGE UP (ref 130–400)
RBC # BLD: 3.18 M/UL — LOW (ref 4.2–5.4)
RBC # FLD: 14.2 % — SIGNIFICANT CHANGE UP (ref 11.5–14.5)
WBC # BLD: 12.12 K/UL — HIGH (ref 4.8–10.8)
WBC # FLD AUTO: 12.12 K/UL — HIGH (ref 4.8–10.8)

## 2022-06-01 PROCEDURE — 99231 SBSQ HOSP IP/OBS SF/LOW 25: CPT

## 2022-06-01 RX ORDER — ACETAMINOPHEN 500 MG
1 TABLET ORAL
Qty: 56 | Refills: 0
Start: 2022-06-01 | End: 2022-06-14

## 2022-06-01 RX ORDER — IBUPROFEN 200 MG
1 TABLET ORAL
Qty: 56 | Refills: 0
Start: 2022-06-01 | End: 2022-06-14

## 2022-06-01 RX ORDER — FERROUS SULFATE 325(65) MG
1 TABLET ORAL
Qty: 90 | Refills: 0
Start: 2022-06-01 | End: 2022-06-30

## 2022-06-01 RX ORDER — SIMETHICONE 80 MG/1
1 TABLET, CHEWABLE ORAL
Qty: 0 | Refills: 0 | DISCHARGE
Start: 2022-06-01

## 2022-06-01 RX ORDER — SENNA PLUS 8.6 MG/1
1 TABLET ORAL
Qty: 14 | Refills: 0
Start: 2022-06-01 | End: 2022-06-14

## 2022-06-01 RX ORDER — IBUPROFEN 200 MG
600 TABLET ORAL EVERY 6 HOURS
Refills: 0 | Status: DISCONTINUED | OUTPATIENT
Start: 2022-06-01 | End: 2022-06-02

## 2022-06-01 RX ADMIN — Medication 975 MILLIGRAM(S): at 09:48

## 2022-06-01 RX ADMIN — Medication 600 MILLIGRAM(S): at 23:43

## 2022-06-01 RX ADMIN — SENNA PLUS 2 TABLET(S): 8.6 TABLET ORAL at 21:15

## 2022-06-01 RX ADMIN — Medication 600 MILLIGRAM(S): at 17:27

## 2022-06-01 RX ADMIN — Medication 30 MILLIGRAM(S): at 11:56

## 2022-06-01 RX ADMIN — Medication 30 MILLIGRAM(S): at 06:09

## 2022-06-01 RX ADMIN — SIMETHICONE 80 MILLIGRAM(S): 80 TABLET, CHEWABLE ORAL at 21:15

## 2022-06-01 RX ADMIN — Medication 1 TABLET(S): at 11:53

## 2022-06-01 RX ADMIN — Medication 975 MILLIGRAM(S): at 21:16

## 2022-06-01 RX ADMIN — SIMETHICONE 80 MILLIGRAM(S): 80 TABLET, CHEWABLE ORAL at 06:10

## 2022-06-01 RX ADMIN — SIMETHICONE 80 MILLIGRAM(S): 80 TABLET, CHEWABLE ORAL at 11:54

## 2022-06-01 RX ADMIN — Medication 975 MILLIGRAM(S): at 09:18

## 2022-06-01 RX ADMIN — Medication 975 MILLIGRAM(S): at 15:45

## 2022-06-01 RX ADMIN — ENOXAPARIN SODIUM 40 MILLIGRAM(S): 100 INJECTION SUBCUTANEOUS at 06:42

## 2022-06-01 RX ADMIN — SIMETHICONE 80 MILLIGRAM(S): 80 TABLET, CHEWABLE ORAL at 15:45

## 2022-06-01 RX ADMIN — Medication 30 MILLIGRAM(S): at 00:06

## 2022-06-01 RX ADMIN — Medication 30 MILLIGRAM(S): at 06:52

## 2022-06-01 RX ADMIN — SIMETHICONE 80 MILLIGRAM(S): 80 TABLET, CHEWABLE ORAL at 17:25

## 2022-06-01 NOTE — DISCHARGE NOTE OB - MEDICATION SUMMARY - MEDICATIONS TO TAKE
I will START or STAY ON the medications listed below when I get home from the hospital:    Alivio 600 mg oral tablet  -- 1 tab(s) by mouth every 6 hours   -- Do not take this drug if you are pregnant.  It is very important that you take or use this exactly as directed.  Do not skip doses or discontinue unless directed by your doctor.  May cause drowsiness or dizziness.  Obtain medical advice before taking any non-prescription drugs as some may affect the action of this medication.  Take with food or milk.    -- Indication: For for pain as needed    acetaminophen 650 mg oral tablet, extended release  -- 1 tab(s) by mouth every 6 hours   -- This product contains acetaminophen.  Do not use  with any other product containing acetaminophen to prevent possible liver damage.    -- Indication: For for pain as needed    ferrous sulfate 325 mg (65 mg elemental iron) oral delayed release tablet  -- 1 tab(s) by mouth 3 times a day   -- May discolor urine or feces.  Swallow whole.  Do not crush.    -- Indication: For for anemia    senna oral tablet  -- 1 tab(s) by mouth once a day   -- Indication: For for constipation    simethicone 80 mg oral tablet, chewable  -- 1 tab(s) by mouth every 4 hours  -- Indication: For for gas

## 2022-06-01 NOTE — DISCHARGE NOTE OB - CARE PROVIDER_API CALL
Damion Cabrera)  Obstetrics and Gynecology  [address] [place] [address]  Phone: [phone]  Fax: [phone]  Follow Up Time:

## 2022-06-01 NOTE — DISCHARGE NOTE OB - BIRTH CERTIFICATE COMPLETED
What Type Of Note Output Would You Prefer (Optional)?: Standard Output How Severe Is Your Skin Lesion?: mild Has Your Skin Lesion Been Treated?: not been treated Is This A New Presentation, Or A Follow-Up?: Skin Lesions Additional History: Pt states she and a salt facial/peel last week and then noticed flat lesions on forehead and wanted to make sure they were ok. Pt states she does have hair color appt after today’s visit. Which Family Member (Optional)?: Father Yes

## 2022-06-01 NOTE — DISCHARGE NOTE OB - HOSPITAL COURSE
22 @ 23:57    HPI:  20yo  at 40w6d GA, EDC 22 by LMP c/w 1st trimester sonogram presenting to L&D for contractions, q5mins, 6/10 in intensity. Denies LOF, VB. Good fetal movement. No complications this pregnancy. h/o LTCS at term for arrest of dilation at 6cm, desires to TOLAC. GBS neg (31 May 2022 10:50)      PAST MEDICAL & SURGICAL HISTORY:  No pertinent past medical history      H/O  section          POST PARTUM COURSE:          LABS:                        9.3    12.12 )-----------( 152      ( 2022 06:01 )             28.8                         11.4   10.20 )-----------( 149      ( 31 May 2022 11:15 )             35.0                   Allergies    No Known Allergies    Intolerances

## 2022-06-01 NOTE — PROGRESS NOTE ADULT - SUBJECTIVE AND OBJECTIVE BOX
PGY 1 Note    Chief Complaint: Post  section    HPI: Pt doing well, pain well controlled. No overnight events, no acute complaints. She is not yet ambulating or passing gas. She is tolerating regular diet without difficulty. She is breastfeeding without any issues. She has a ortega in place.    ROS: Denies cardiovascular or respiratory symptoms    PAST MEDICAL & SURGICAL HISTORY:  No pertinent past medical history    H/O  section    Physical Exam  Vital Signs Last 24 Hrs  T(F): 99.6 (2022 00:35), Max: 99.6 (2022 00:35)  HR: 93 (2022 00:35) (55 - 109)  BP: 99/55 (2022 00:35) (88/49 - 126/74)  RR: 18 (2022 00:35) (18 - 20)    Physical exam:  General - AAOx3, NAD  Heart - S1S2, RRR  Lungs - CTA BL  Abdomen:  - Soft, appropriately tender, mildly distended, BS+. Clean, dry, intact dressing in place over pfannenstiel skin incision.  - Fundus firm, appropriately tender, below the umbilicus  Pelvis/Vagina - Normal Lochia  Extremities - No calf tenderness, no swelling    Labs:                        11.4   10.20 )-----------( 149      ( 31 May 2022 11:15 )             35.0     Antibody Screen: NEG (22 @ 11:24)    Prenatal Syphilis Test: Nonreact (22 @ 11:15)    UO: (9250-8111) 1315cc

## 2022-06-01 NOTE — PROGRESS NOTE ADULT - SUBJECTIVE AND OBJECTIVE BOX
PGY2 Note:  Patient seen and examined at bedside, recovering well, no acute complaints. Denies fever, chills, chest pain, SOB, nausea, vomiting, LE pain. Pain well-controlled with medication. Minimal bleeding, ortega in place, tolerating PO. Not yet voiding, ambulating, passed flatus. Breastfeeding.    Physical Exam  Vital Signs Last 24 Hrs  T(C): 36.3 (01 Jun 2022 03:30), Max: 37.6 (01 Jun 2022 00:35)  T(F): 97.3 (01 Jun 2022 03:30), Max: 99.6 (01 Jun 2022 00:35)  HR: 68 (01 Jun 2022 03:30) (55 - 109)  BP: 92/52 (01 Jun 2022 03:30) (88/49 - 126/74)  RR: 18 (01 Jun 2022 03:30) (18 - 20)  SpO2: 99% (31 May 2022 17:14) (81% - 100%)    Gen: AAOx3, NAD  Heart: Normal S1S2, RRR, no m/r/g  Lungs: CTA b/l, no r/r/w   Fundus: firm, below umbilicus   Wound: steris in place c/d/i   Abd: Soft, nontender, nondistended, BS+  Lochia: minimal  Ext: No calf tenderness, no swelling, SCDs in place  UO: ([id number]) 100cc    Labs:                        11.4   10.20 )-----------( 149      ( 31 May 2022 11:15 )             35.0        acetaminophen     Tablet .. 975 milliGRAM(s) Oral <User Schedule>  acetaminophen   IVPB .. 1000 milliGRAM(s) IV Intermittent once  dexAMETHasone  Injectable 4 milliGRAM(s) IV Push every 6 hours PRN  dexAMETHasone  Injectable 4 milliGRAM(s) IV Push every 6 hours PRN  diphenhydrAMINE 25 milliGRAM(s) Oral every 6 hours PRN  diphtheria/tetanus/pertussis (acellular) Vaccine (ADAcel) 0.5 milliLiter(s) IntraMuscular once  enoxaparin Injectable 40 milliGRAM(s) SubCutaneous every 24 hours  fentanyl (2 MICROgram(s)/mL) + bupivacaine 0.0625%  in 0.9% Sodium Chloride PCEA 250 milliLiter(s) Epidural PCA Continuous  ibuprofen  Tablet. 600 milliGRAM(s) Oral every 6 hours  influenza   Vaccine 0.5 milliLiter(s) IntraMuscular once  ketorolac   Injectable 30 milliGRAM(s) IV Push every 6 hours  lactated ringers. 1000 milliLiter(s) IV Continuous <Continuous>  lanolin Ointment 1 Application(s) Topical every 6 hours PRN  magnesium hydroxide Suspension 30 milliLiter(s) Oral two times a day PRN  measles/mumps/rubella Vaccine 0.5 milliLiter(s) SubCutaneous once  morphine PF Epidural 2 milliGRAM(s) Epidural once  naloxone Injectable 0.1 milliGRAM(s) IV Push every 3 minutes PRN  naloxone Injectable 0.1 milliGRAM(s) IV Push every 3 minutes PRN  ondansetron Injectable 4 milliGRAM(s) IV Push every 6 hours PRN  ondansetron Injectable 4 milliGRAM(s) IV Push every 6 hours PRN  oxyCODONE    IR 5 milliGRAM(s) Oral every 3 hours PRN  oxyCODONE    IR 5 milliGRAM(s) Oral once PRN  oxytocin Infusion 333.333 milliUNIT(s)/Min IV Continuous <Continuous>  oxytocin Infusion 333.333 milliUNIT(s)/Min IV Continuous <Continuous>  prenatal multivitamin 1 Tablet(s) Oral daily  senna 2 Tablet(s) Oral at bedtime  simethicone 80 milliGRAM(s) Chew every 4 hours

## 2022-06-01 NOTE — DISCHARGE NOTE OB - CARE PLAN
Principal Discharge DX:	H/O:   Assessment and plan of treatment:	please follow up with your doctor in 1 week for incision check and 6 weeks for postpartum visit   1

## 2022-06-01 NOTE — DISCHARGE NOTE OB - PATIENT PORTAL LINK FT
You can access the FollowMyHealth Patient Portal offered by [facility] by registering at the following website: [email or web address]. By joining [facility]’s FollowMyHealth portal, you will also be able to view your health information using other applications (apps) compatible with our system.

## 2022-06-01 NOTE — DISCHARGE NOTE OB - NS MD DC FALL RISK RISK
For information on Fall & Injury Prevention, visit: [email or web address] OR  [email or web address] OR  https://www.cdc.gov/steadi/patient.html

## 2022-06-02 VITALS
DIASTOLIC BLOOD PRESSURE: 57 MMHG | HEART RATE: 75 BPM | RESPIRATION RATE: 18 BRPM | SYSTOLIC BLOOD PRESSURE: 100 MMHG | TEMPERATURE: 98 F

## 2022-06-02 PROCEDURE — 99231 SBSQ HOSP IP/OBS SF/LOW 25: CPT

## 2022-06-02 RX ADMIN — Medication 600 MILLIGRAM(S): at 11:35

## 2022-06-02 RX ADMIN — Medication 600 MILLIGRAM(S): at 00:13

## 2022-06-02 RX ADMIN — SIMETHICONE 80 MILLIGRAM(S): 80 TABLET, CHEWABLE ORAL at 06:08

## 2022-06-02 RX ADMIN — Medication 600 MILLIGRAM(S): at 06:37

## 2022-06-02 RX ADMIN — Medication 1 TABLET(S): at 11:35

## 2022-06-02 RX ADMIN — Medication 975 MILLIGRAM(S): at 02:45

## 2022-06-02 RX ADMIN — Medication 975 MILLIGRAM(S): at 02:15

## 2022-06-02 RX ADMIN — SIMETHICONE 80 MILLIGRAM(S): 80 TABLET, CHEWABLE ORAL at 09:49

## 2022-06-02 RX ADMIN — Medication 600 MILLIGRAM(S): at 06:07

## 2022-06-02 RX ADMIN — ENOXAPARIN SODIUM 40 MILLIGRAM(S): 100 INJECTION SUBCUTANEOUS at 06:08

## 2022-06-02 NOTE — PROGRESS NOTE ADULT - ATTENDING COMMENTS
Pt seen and examined at bedside, Pt is a 21y now P2 s/p repeat LTCS for arrest of dilation, POD#2, recovering well ,, tolerating regular diet, wants d/c home. pain well controlled. Incision c/d/i, abdomen soft nt.   -d/c home with instructions  -f/u 1 wks for wound check  -4-6 wks for pp exam

## 2022-06-02 NOTE — PROGRESS NOTE ADULT - ASSESSMENT
21y now P2 s/p repeat LTCS for arrest of dilation, POD#2, recovering well     - encourage ambulation, PO hydration  - monitor vitals, bleeding   - regular diet  - encourage incentive spirometry   - pain mgmt prn   - simethicone  - routine postop care   - lovenox   - anticipate d/c home     Dr. Cabrera and Dr. Schuster to be made aware
21y now P2 s/p repeat LTCS for arrest of dilation, POD#1, recovering well     - encourage ambulation, PO hydration  - f/u AM CBC   - monitor vitals, bleeding   - regular diet  - ortega removed, TOV 1230  - encourage incentive spirometry   - pain mgmt prn   - simethicone  - routine postop care   - lovenox   - anticipate d/c home tomorrow    Dr. Cabrera and Dr. Schuster to be made aware
22yo P2 S/P repeat LTCS for arrest of dilation POD#1, recovering well.    - encourage ambulation  - PO hydration   - Continue Diet as tolerated  - DVT ppx: SCDs and Lovenox   - Incentive Spirometry bedside and encouraged   - f/u AM CBC   - Vital signs [id number]   - Addison catheter in place until am, urine currently adequate    Dr. Ayala and Dr. Cabrera

## 2022-06-02 NOTE — PROGRESS NOTE ADULT - SUBJECTIVE AND OBJECTIVE BOX
PGY2 Note:  Patient seen and examined at bedside, recovering well, no acute complaints. Denies fever, chills, chest pain, SOB, nausea, vomiting, LE pain. Pain well-controlled with medication. Minimal bleeding, voiding, ambulating, passed flatus, tolerating regular diet. r    Physical Exam  Vital Signs Last 24 Hrs  T(C): 36.2 (02 Jun 2022 00:06), Max: 36.2 (01 Jun 2022 07:52)  T(F): 97.1 (02 Jun 2022 00:06), Max: 97.2 (01 Jun 2022 07:52)  HR: 77 (02 Jun 2022 00:06) (72 - 88)  BP: 107/56 (02 Jun 2022 00:06) (90/53 - 107/56)  RR: 18 (02 Jun 2022 00:06) (18 - 18)    Gen: AAOx3, NAD  Heart: Normal S1S2, RRR, no m/r/g  Lungs: CTA b/l, no r/r/w   Fundus: firm, below umbilicus   Wound: steris in place c/d/i   Abd: Soft, nontender, nondistended, BS+  Lochia: minimal  Ext: No calf tenderness, no swelling    Labs:                        9.3    12.12 )-----------( 152      ( 01 Jun 2022 06:01 )             28.8                         11.4   10.20 )-----------( 149      ( 31 May 2022 11:15 )             35.0        acetaminophen     Tablet .. 975 milliGRAM(s) Oral <User Schedule>  acetaminophen   IVPB .. 1000 milliGRAM(s) IV Intermittent once  dexAMETHasone  Injectable 4 milliGRAM(s) IV Push every 6 hours PRN  dexAMETHasone  Injectable 4 milliGRAM(s) IV Push every 6 hours PRN  diphenhydrAMINE 25 milliGRAM(s) Oral every 6 hours PRN  diphtheria/tetanus/pertussis (acellular) Vaccine (ADAcel) 0.5 milliLiter(s) IntraMuscular once  enoxaparin Injectable 40 milliGRAM(s) SubCutaneous every 24 hours  fentanyl (2 MICROgram(s)/mL) + bupivacaine 0.0625%  in 0.9% Sodium Chloride PCEA 250 milliLiter(s) Epidural PCA Continuous  ibuprofen  Tablet. 600 milliGRAM(s) Oral every 6 hours  influenza   Vaccine 0.5 milliLiter(s) IntraMuscular once  lactated ringers. 1000 milliLiter(s) IV Continuous <Continuous>  lanolin Ointment 1 Application(s) Topical every 6 hours PRN  magnesium hydroxide Suspension 30 milliLiter(s) Oral two times a day PRN  morphine PF Epidural 2 milliGRAM(s) Epidural once  naloxone Injectable 0.1 milliGRAM(s) IV Push every 3 minutes PRN  naloxone Injectable 0.1 milliGRAM(s) IV Push every 3 minutes PRN  ondansetron Injectable 4 milliGRAM(s) IV Push every 6 hours PRN  ondansetron Injectable 4 milliGRAM(s) IV Push every 6 hours PRN  oxyCODONE    IR 5 milliGRAM(s) Oral every 3 hours PRN  oxyCODONE    IR 5 milliGRAM(s) Oral once PRN  oxytocin Infusion 333.333 milliUNIT(s)/Min IV Continuous <Continuous>  oxytocin Infusion 333.333 milliUNIT(s)/Min IV Continuous <Continuous>  prenatal multivitamin 1 Tablet(s) Oral daily  senna 2 Tablet(s) Oral at bedtime  simethicone 80 milliGRAM(s) Chew every 4 hours

## 2022-06-06 DIAGNOSIS — O34.211 MATERNAL CARE FOR LOW TRANSVERSE SCAR FROM PREVIOUS CESAREAN DELIVERY: ICD-10-CM

## 2022-06-06 DIAGNOSIS — Z3A.40 40 WEEKS GESTATION OF PREGNANCY: ICD-10-CM

## 2022-06-06 DIAGNOSIS — Z20.822 CONTACT WITH AND (SUSPECTED) EXPOSURE TO COVID-19: ICD-10-CM

## 2022-06-06 DIAGNOSIS — Z28.21 IMMUNIZATION NOT CARRIED OUT BECAUSE OF PATIENT REFUSAL: ICD-10-CM

## 2022-06-06 DIAGNOSIS — Z28.310 UNVACCINATED FOR COVID-19: ICD-10-CM

## 2022-06-06 DIAGNOSIS — Z28.09 IMMUNIZATION NOT CARRIED OUT BECAUSE OF OTHER CONTRAINDICATION: ICD-10-CM

## 2022-08-30 NOTE — OB PROVIDER H&P - NS_ADMITREASON_OBGYN_ALL_OB
Cleanse wound with: Vashe   Periwound care: skin prep periwound , allow to dry well   Primary dressing: apply Opticel Ag over open area of heel and posterior lower leg,   Secondary dressing: cover with gauze, abd prn, kerlix to heel, gauze and cover dressing to leg, bandaid to elbow   Offloading: elevate for edema control, keep pressure off posterior calf/heel   Edema control: Tubigrip E   Frequency: Change every other day  Follow-up: 2 weeks      
Labor at Term

## 2022-10-05 NOTE — OB RN PATIENT PROFILE - POST PARTUM DEPRESSION SCREEN OB 3
Post-Discharge Transitional Care Follow Up      Nj Siddiqui   YOB: 1959    Date of Office Visit:  10/5/2022  Date of Hospital Admission: 10/1/22  Date of Hospital Discharge: 10/1/22  Readmission Risk Score (high >=14%. Medium >=10%):No data recorded    Care management risk score Rising risk (score 2-5) and Complex Care (Scores >=6): No Risk Score On File     Non face to face  following discharge, date last encounter closed (first attempt may have been earlier): *No documented post hospital discharge outreach found in the last 14 days     Call initiated 2 business days of discharge: *No response recorded in the last 14 days     Hospital discharge follow-up  -     [place] DISCHARGE MEDS RECONCILED W/ CURRENT OUTPATIENT MED LIST  Dog bite of right upper arm, subsequent encounter  Comments:  Reviewed hospital notes  On abx  Received Tdap  Inquires about rabies shots    Medical Decision Making: low complexity  Return in 3 months (on 1/5/2023). On this date 10/5/2022 I have spent 25 minutes reviewing previous notes, test results and face to face with the patient discussing the diagnosis and importance of compliance with the treatment plan as well as documenting on the day of the visit. Subjective:   EMERGENCY DEPARTMENT ENCOUNTER    Pt Name: Nj Siddiqui  MRN: [id number]  [place] 1959  Date of evaluation: 10/1/22  CHIEF COMPLAINT       Chief Complaint  Patient presents with  · Animal Bite      Back of right arm, dressing in place by ems     HISTORY OF PRESENT ILLNESS  68-year-old female presenting to the ER after a dog bite to the right upper extremity. The patient was concerned about a possible rabies infection. The patient states that she felt the dog's teeth multiple times but she feels like there was only a puncture once or twice. Patient denies falling after the event. Patient states she did bleed after the event but is primarily concerned about possible rabies.   The owner of the dog was spoken with and she did state that the dog is not vaccinated.     The history is provided by the patient. Animal Bite  Contact animal:  Dog  Location:  Shoulder/arm  Shoulder/arm injury location:  R upper arm  Time since incident:  1 hour  Incident location:  Outside  Provoked: unprovoked    Notifications:  Law enforcement  Animal's rabies vaccination status:  Never received  Tetanus status:  Out of date  Associated symptoms: no rash       MEDICAL DECISION MAKIN-year-old female with a dog bite to the right upper extremity. X-ray imaging did not display signs of acute abnormality. Patient was prescribed an antibiotic for outpatient use. The wound was cleaned with normal saline and the patient was provided with a tetanus shot in the ER. Patient instructed to follow-up with the primary care physician in 2 days. Patient instructed to return to the ER immediately if symptoms worsen or change. Patient understands and agrees with the plan.         Inpatient course: Discharge summary reviewed- see chart. Interval history/Current status: improving    Patient Active Problem List   Diagnosis    Current smoker    Mixed anxiety depressive disorder    RLS (restless legs syndrome)    Sleep disturbance    Thyroid nodule, Rt. Lobe.  27 mm    Atrial fibrillation (HCC)    Depression    Urge incontinence    S/P ablation of atrial fibrillation    Dilation of biliary tract    MIRELLA (obstructive sleep apnea)    Migraine without aura and without status migrainosus, not intractable    Atrophic vaginitis    Atopic dermatitis    Basal cell carcinoma of skin    Cherubism, gingival fibromatosis, epilepsy, intellectual disability, hypertrichosis, and stunted growth syndrome    Chronic cystitis    Intertrigo    NSVT (nonsustained ventricular tachycardia)    Overactive bladder    Squamous cell carcinoma of skin       Medications listed as ordered at the time of discharge from hospital     Medication List Accurate as of October 5, 2022  1:13 PM. If you have any questions, ask your nurse or doctor. CONTINUE taking these medications      buPROPion 300 MG extended release tablet  Commonly known as: WELLBUTRIN XL  take 1 tablet by mouth every morning     butalbital-acetaminophen-caffeine -40 MG per tablet  Commonly known as: FIORICET, ESGIC  Take one tab every other day as needed for severe headache     clindamycin 150 MG capsule  Commonly known as: CLEOCIN  Take 2 capsules by mouth 3 times daily for 7 days     estradiol 0.1 MG/GM vaginal cream  Commonly known as: ESTRACE     Lyrica 200 MG capsule  Generic drug: pregabalin     multivitamin tablet  Take 1 tablet by mouth daily     rOPINIRole 1 MG tablet  Commonly known as: REQUIP  Take 1 tablet by mouth 4 times daily     sulfamethoxazole-trimethoprim 800-160 MG per tablet  Commonly known as:  Bactrim DS  Take 1 tablet by mouth 2 times daily for 7 days     temazepam 15 MG capsule  Commonly known as: RESTORIL     verapamil 120 MG extended release tablet  Commonly known as: CALAN SR  Take 1 tablet by mouth daily               Medications marked \"taking\" at this time  Outpatient Medications Marked as Taking for the 10/5/22 encounter (Office Visit) with Bessy Backers, DO   Medication Sig Dispense Refill    clindamycin (CLEOCIN) 150 MG capsule Take 2 capsules by mouth 3 times daily for 7 days 42 capsule 0    sulfamethoxazole-trimethoprim (BACTRIM DS) 800-160 MG per tablet Take 1 tablet by mouth 2 times daily for 7 days 14 tablet 0    estradiol (ESTRACE) 0.1 MG/GM vaginal cream Twice/ week      butalbital-acetaminophen-caffeine (FIORICET, ESGIC) -40 MG per tablet Take one tab every other day as needed for severe headache 40 tablet 3    verapamil (CALAN SR) 120 MG extended release tablet Take 1 tablet by mouth daily 90 tablet 3    buPROPion (WELLBUTRIN XL) 300 MG extended release tablet take 1 tablet by mouth every morning 90 tablet 1    temazepam (RESTORIL) 15 MG capsule   2    LYRICA 200 MG capsule Take 2 capsules by mouth daily. 5    rOPINIRole (REQUIP) 1 MG tablet Take 1 tablet by mouth 4 times daily 90 tablet 3    Multiple Vitamin (MULTIVITAMIN) tablet Take 1 tablet by mouth daily 30 tablet 3        Medications patient taking as of now reconciled against medications ordered at time of hospital discharge: Yes    Review of Systems   Constitutional: Negative. HENT: Negative. Eyes: Negative. Respiratory: Negative. Cardiovascular: Negative. Gastrointestinal: Negative. Endocrine: Negative. Genitourinary: Negative. Musculoskeletal: Negative. Skin: Negative. Allergic/Immunologic: Negative. Neurological: Negative. Hematological: Negative. Psychiatric/Behavioral: Negative. All other systems reviewed and are negative. Objective:    /70   Pulse 78   Wt 192 lb (87.1 kg)   SpO2 96%   BMI 31.95 kg/m²   Physical Exam  Vitals and nursing note reviewed. Constitutional:       General: She is not in acute distress. Appearance: Normal appearance. She is obese. She is not ill-appearing, toxic-appearing or diaphoretic. HENT:      Head: Normocephalic and atraumatic. Eyes:      General: No scleral icterus. Right eye: No discharge. Left eye: No discharge. Extraocular Movements: Extraocular movements intact. Conjunctiva/sclera: Conjunctivae normal.   Cardiovascular:      Rate and Rhythm: Normal rate and regular rhythm. Pulses: Normal pulses. Heart sounds: Normal heart sounds. No murmur heard. No friction rub. No gallop. Pulmonary:      Effort: Pulmonary effort is normal. No respiratory distress. Breath sounds: Normal breath sounds. No stridor. No wheezing, rhonchi or rales. Chest:      Chest wall: No tenderness. Skin:     Findings: Signs of injury (puncture wounds from dog bites) present.           Neurological:      Mental Status: She is alert and oriented to person, place, and time. Mental status is at baseline. Psychiatric:         Mood and Affect: Mood normal.         Behavior: Behavior normal.         Thought Content: Thought content normal.         Judgment: Judgment normal.       An electronic signature was used to authenticate this note.   --Ck Mcdonald, DO no

## 2022-11-08 NOTE — PRE-ANESTHESIA EVALUATION ADULT - NSANTHOSAYNRD_GEN_A_CORE
Incidentally positive on discharge screen. Asymptomatic and not hypoxemic.  - ID consult recommended remdesivir. Recommend 5 day course 11/1-11/5  - Blood culture 11/1 is likely a contaminant as staph epi only growing in 1 vial.   - 11/2 blood culture repeats NGTD  - Tylenol as needed for fevers Incidentally positive on discharge screen. Asymptomatic and not hypoxemic.  - s/p 5 day course 11/1-11/5  - Blood culture 11/1 is likely a contaminant as staph epi only growing in 1 vial.   - 11/2 blood culture repeats NG Final  - Tylenol as needed for fevers No. EASTON screening performed.  STOP BANG Legend: 0-2 = LOW Risk; 3-4 = INTERMEDIATE Risk; 5-8 = HIGH Risk

## 2022-12-02 NOTE — OB RN PATIENT PROFILE - PRO PRENATAL LABS ORI SOURCE HIV
"Subjective     Sophy Soto is a 60 y.o. female who presents with Work-Related Injury ([id number]      \" Date of injury 11/10/2022: Patient presents for second visit, first in urgent care following right shoulder injury.  Patient sustained this injury after a day where she unloaded a lot of boxes from a pallet.  At the end of the day she noted soreness in both shoulders however over the following days started noticing more focal right shoulder pain.  She has pain with reaching above the right shoulder.  Was seen in the ER at the local hospital and had x-rays which were reportedly negative for fracture and was prescribed Robaxin.  She turned towards improvement until 2 days ago when she tried more aggressive lifting at work and felt like her pain is slightly worse.  She denies any numbness or tingling.  She is right-handed.  She has no contributory comorbidities and no second \"    ** 12/2/2022 2nd UC visit, feels same since last visit. Rt anterior shoulder pain, worse w/ lifting and sleeping on Rt side. No limb weakness      HPI    Review of Systems   Musculoskeletal:  Positive for joint pain.   All other systems reviewed and are negative.           Objective     /70   Pulse 62   Temp 36.3 °C (97.3 °F) (Temporal)   Resp 14   Ht 1.651 m (5' 5\")   Wt 66.7 kg (147 lb)   SpO2 98%   BMI 24.46 kg/m²      Physical Exam  Vitals and nursing note reviewed.   Constitutional:       General: She is not in acute distress.     Appearance: Normal appearance. She is well-developed.   HENT:      Head: Normocephalic.   Pulmonary:      Effort: Pulmonary effort is normal. No respiratory distress.   Musculoskeletal:        Arms:       Comments: Rt shoulder: no wounds/edema/discoloration. Good srom, some ttp. Flex/abd 90 deg then feels pain, decrease IR due to pain    Neurological:      Mental Status: She is alert.      Motor: No abnormal muscle tone.   Psychiatric:         Mood and " Affect: Mood normal.         Behavior: Behavior normal.                           Assessment & Plan       1. Strain of right shoulder, subsequent encounter  meloxicam (MOBIC) 7.5 MG Tab           No lifting, pulling, pushing more than 10 lbs. No overhead lifting/reaching    1 wk recheck, sooner if needed, ER worse     hard copy, drawn during this pregnancy

## 2023-01-05 RX ORDER — NORETHINDRONE 0.35 MG/1
0.35 TABLET ORAL
Qty: 1 | Refills: 5 | Status: ACTIVE | COMMUNITY
Start: 2023-01-05 | End: 1900-01-01

## 2023-01-25 ENCOUNTER — APPOINTMENT (OUTPATIENT)
Dept: OBGYN | Facility: CLINIC | Age: 23
End: 2023-01-25
Payer: MEDICAID

## 2023-01-25 ENCOUNTER — LABORATORY RESULT (OUTPATIENT)
Age: 23
End: 2023-01-25

## 2023-01-25 VITALS
BODY MASS INDEX: 21.71 KG/M2 | DIASTOLIC BLOOD PRESSURE: 74 MMHG | WEIGHT: 115 LBS | HEIGHT: 61 IN | SYSTOLIC BLOOD PRESSURE: 118 MMHG

## 2023-01-25 DIAGNOSIS — N89.8 OTHER SPECIFIED NONINFLAMMATORY DISORDERS OF VAGINA: ICD-10-CM

## 2023-01-25 DIAGNOSIS — Z78.9 OTHER SPECIFIED HEALTH STATUS: ICD-10-CM

## 2023-01-25 LAB
BILIRUB UR QL STRIP: NORMAL
GLUCOSE UR-MCNC: NORMAL
HCG UR QL: 0.2 EU/DL
HCG UR QL: NEGATIVE
HGB UR QL STRIP.AUTO: NORMAL
KETONES UR-MCNC: NORMAL
LEUKOCYTE ESTERASE UR QL STRIP: NORMAL
NITRITE UR QL STRIP: NORMAL
PH UR STRIP: 7
PROT UR STRIP-MCNC: NORMAL
QUALITY CONTROL: YES
SP GR UR STRIP: 1.01

## 2023-01-25 PROCEDURE — 81025 URINE PREGNANCY TEST: CPT

## 2023-01-25 PROCEDURE — 99395 PREV VISIT EST AGE 18-39: CPT

## 2023-01-25 PROCEDURE — 81003 URINALYSIS AUTO W/O SCOPE: CPT | Mod: QW

## 2023-01-25 PROCEDURE — 99213 OFFICE O/P EST LOW 20 MIN: CPT | Mod: TH

## 2023-01-26 LAB
C TRACH RRNA SPEC QL NAA+PROBE: NOT DETECTED
N GONORRHOEA RRNA SPEC QL NAA+PROBE: NOT DETECTED
SOURCE TP AMPLIFICATION: NORMAL

## 2023-01-30 LAB — CYTOLOGY CVX/VAG DOC THIN PREP: ABNORMAL

## 2023-01-31 NOTE — PHYSICAL EXAM
[Chaperone Present] : A chaperone was present in the examining room during all aspects of the physical examination [Labia Majora] : normal [Labia Minora] : normal [Normal] : normal [Uterine Adnexae] : normal [FreeTextEntry4] : mucousy discharge

## 2023-01-31 NOTE — PLAN
[FreeTextEntry1] : pap and cultures taken\par if cultures negative and discharge persists for TVS\par explained this may be hormonal-ovulation

## 2023-01-31 NOTE — HISTORY OF PRESENT ILLNESS
[FreeTextEntry1] : 23 yo p2022 breastfeeding on micronor. amenorrheic\par complaining of discharge. no odor, no burn or itching\par no urinary complaints

## 2023-02-21 NOTE — OB RN PATIENT PROFILE - TDAP IMMUNIZATION DATE
Attempted to reach patient's guardian to discuss OR date but was unsuccessful  Left message for guardian to call the office back  Office number provided 
feb 2019

## 2023-06-08 ENCOUNTER — APPOINTMENT (OUTPATIENT)
Dept: OBGYN | Facility: CLINIC | Age: 23
End: 2023-06-08
Payer: MEDICAID

## 2023-06-08 VITALS
HEIGHT: 61 IN | WEIGHT: 117 LBS | DIASTOLIC BLOOD PRESSURE: 73 MMHG | BODY MASS INDEX: 22.09 KG/M2 | SYSTOLIC BLOOD PRESSURE: 110 MMHG

## 2023-06-08 DIAGNOSIS — N64.4 MASTODYNIA: ICD-10-CM

## 2023-06-08 LAB
BILIRUB UR QL STRIP: NORMAL
GLUCOSE UR-MCNC: NORMAL
HCG UR QL: 0.2 EU/DL
HGB UR QL STRIP.AUTO: NORMAL
KETONES UR-MCNC: NORMAL
LEUKOCYTE ESTERASE UR QL STRIP: NORMAL
NITRITE UR QL STRIP: NORMAL
PH UR STRIP: 5.5
PROT UR STRIP-MCNC: NORMAL
SP GR UR STRIP: >=1.03

## 2023-06-08 PROCEDURE — 81025 URINE PREGNANCY TEST: CPT

## 2023-06-08 PROCEDURE — 81003 URINALYSIS AUTO W/O SCOPE: CPT | Mod: QW

## 2023-06-08 PROCEDURE — 99212 OFFICE O/P EST SF 10 MIN: CPT

## 2023-06-08 RX ORDER — MUPIROCIN 20 MG/G
2 OINTMENT TOPICAL 3 TIMES DAILY
Qty: 1 | Refills: 0 | Status: ACTIVE | COMMUNITY
Start: 2023-06-08 | End: 1900-01-01

## 2023-06-08 RX ORDER — NORETHINDRONE 0.35 MG/1
0.35 TABLET ORAL
Qty: 1 | Refills: 6 | Status: ACTIVE | COMMUNITY
Start: 2023-06-08 | End: 1900-01-01

## 2023-06-08 NOTE — HISTORY OF PRESENT ILLNESS
[Localized Pain] : localized pain [Nipple Discharge] : nipple discharge [Mild] : mild [Nursing] : nursing [FreeTextEntry1] : 21 yo p2022 breastfeeding amenorrheic on Gerda\par baby teething\par left nipple cracked underside with small amount of pus seen on occasion\par no breast pain, no fever or constitutional symptoms \par \par chart reviewed agree with above  ES

## 2023-06-08 NOTE — PLAN
[FreeTextEntry1] : fissure underside left nipple\par wound culture taken\par no pus expressed now\par bactroban

## 2023-06-08 NOTE — PHYSICAL EXAM
[Examination Of The Breasts] : a normal appearance [Breast Nipple Fissures Left] : fissured [No Masses] : no breast masses were palpable

## 2023-06-11 LAB — BACTERIA SPEC CULT: NORMAL

## 2023-06-12 LAB — HCG UR QL: NEGATIVE

## 2023-07-17 RX ORDER — NORGESTREL AND ETHINYL ESTRADIOL 0.3-0.03MG
0.3-3 KIT ORAL
Qty: 3 | Refills: 3 | Status: ACTIVE | COMMUNITY
Start: 2023-07-17 | End: 1900-01-01

## 2023-07-31 NOTE — OB RN DELIVERY SUMMARY - NS_DELIVERYNEONATOLOGIST2_OBGYN_ALL_OB_FT
Cuca Lubin (:  1983) is a Established patient, here for evaluation of the following:    Subjective   HPI:    Chief Complaint   Patient presents with    Anxiety     Pt presents to discuss starting something for her anxiety. Several life stressors at this time that is causing increased anxiety and lack of sleep. Patient Active Problem List   Diagnosis    Hypothyroidism    Type 2 diabetes mellitus with hyperglycemia, without long-term current use of insulin (HCC)    PCOS (polycystic ovarian syndrome)    Hyperlipidemia    CABRERA (nonalcoholic steatohepatitis)    Obesity    Adjustment disorder with depressed mood    Controlled type 2 diabetes mellitus without complication (HCC)    Viral pharyngitis    Postoperative pain    Headache    Gastroesophageal reflux disease     Past Surgical History:   Procedure Laterality Date    CHOLECYSTECTOMY      DILATION AND CURETTAGE OF UTERUS N/A 2019    DILATATION AND CURETTAGE HYSTEROSCOP, DIAGNOSTIC  LAPAROSCOPY performed by Emiliana Guerrero DO at [address] [facility] ( [facility]) N/A 2019    HYSTERECTOMY ABDOMINAL LAPAROSCOPIC ROBOTIC with cystocopy performed by Emiliana Guerrero DO at [address] [facility], [address]      right     LAPAROSCOPY Right 10/07/2016    Oophorectomy Laparoscopic by Dr Jose Miguel Caldwell      age 43, one ovary removed     Social History     Tobacco Use    Smoking status: Never    Smokeless tobacco: Never   Vaping Use    Vaping Use: Never used   Substance Use Topics    Alcohol use: Not Currently     Alcohol/week: 0.0 standard drinks     Comment: occasional    Drug use: No       Review of Systems   Constitutional: Negative. HENT: Negative. Respiratory: Negative. Cardiovascular: Negative. Gastrointestinal: Negative. Musculoskeletal: Negative. Psychiatric/Behavioral:  Positive for decreased concentration, dysphoric mood and sleep disturbance. The patient is nervous/anxious. Susannah

## 2023-09-25 NOTE — H&P PST ADULT - PRO ARRIVE FROM
Ema Rodriguez 9/25/2023 by CANDICE Hines No    Overview Signed 9/25/7/10/2023 by CANDICE Hines No    Overview Addendum 8/28/2023  1:29 PM by Stalin Velez MD     1 hr GTT elevated  3 hr GTT wnl          Migraines 5/5/2023 by Oziel Knight MD No    Overview Signed 5/5/pregnancy 4/12/2023 by Liz Nur MD No    Overview Addendum 9/11/1/10/2023 by Aissatou Gallardo MD No    Overview Addendum 9/10/2023  4:01 PM by Vanita Levine MD     Gardasil as a teenager (1 dose documented in 2011)   Prior abnormal pap with normal colposcopy, per patient. [facility] records indicate ASCUS pap previously  4/16/19: LSIL  12/9/22 Pap: ASCUS, HPV18+  1/3/6/7/2023 by CANDICE Corcoran 8/28/2023 by Abdelrahman Payan MD    Overview Addendum 8/28/

## 2023-11-21 ENCOUNTER — APPOINTMENT (OUTPATIENT)
Dept: OBGYN | Facility: CLINIC | Age: 23
End: 2023-11-21
Payer: MEDICAID

## 2023-11-21 VITALS — WEIGHT: 121 LBS | SYSTOLIC BLOOD PRESSURE: 127 MMHG | DIASTOLIC BLOOD PRESSURE: 77 MMHG

## 2023-11-21 DIAGNOSIS — Z00.00 ENCOUNTER FOR GENERAL ADULT MEDICAL EXAMINATION W/OUT ABNORMAL FINDINGS: ICD-10-CM

## 2023-11-21 DIAGNOSIS — N93.9 ABNORMAL UTERINE AND VAGINAL BLEEDING, UNSPECIFIED: ICD-10-CM

## 2023-11-21 LAB
BILIRUB UR QL STRIP: NORMAL
GLUCOSE UR-MCNC: NORMAL
HCG UR QL: 0.2 EU/DL
HCG UR QL: NEGATIVE
HGB UR QL STRIP.AUTO: NORMAL
KETONES UR-MCNC: NORMAL
LEUKOCYTE ESTERASE UR QL STRIP: NORMAL
NITRITE UR QL STRIP: NORMAL
PH UR STRIP: 5
PROT UR STRIP-MCNC: NORMAL
SP GR UR STRIP: 1.01

## 2023-11-21 PROCEDURE — 81003 URINALYSIS AUTO W/O SCOPE: CPT | Mod: QW

## 2023-11-21 PROCEDURE — 81025 URINE PREGNANCY TEST: CPT

## 2023-11-21 PROCEDURE — 76830 TRANSVAGINAL US NON-OB: CPT

## 2023-11-21 PROCEDURE — 99213 OFFICE O/P EST LOW 20 MIN: CPT | Mod: 25

## 2023-12-29 RX ORDER — NORGESTREL AND ETHINYL ESTRADIOL 0.3-0.03MG
0.3-3 KIT ORAL DAILY
Qty: 3 | Refills: 1 | Status: ACTIVE | COMMUNITY
Start: 2023-12-29 | End: 1900-01-01

## 2024-06-27 NOTE — OB PROVIDER H&P - NS_FHRVARIABILITY_OBGYN_ALL_OB
"Patient has hyponatremia which is uncontrolled,We will aim to correct the sodium by 4-6mEq in 24 hours. We will monitor sodium Daily. The hyponatremia is due to Medications: SSRIs and renal insufficiency. We will obtain the following studies: Urine sodium, urine osmolality, serum osmolality. We will treat the hyponatremia with Hemodialysis and Removal of offending medications. The patient's sodium results have been reviewed and are listed below.  No results for input(s): "NA" in the last 24 hours.  - patient with chronic hyponatremia most likely 2/2 ESRD  " Moderate Variability

## 2024-08-15 NOTE — PATIENT PROFILE, NEWBORN NICU. - THE IMPORTANCE OF THE CORRECT PLACEMENT OF THE INFANT AND THE PARENT’S ABILITY TO ASSESS THE INFANT'S SKIN COLOR WHILE PLACED ON SKIN TO SKIN HAS BEEN REINFORCED.
[FreeTextEntry1] : labs from PMD:  (1/21/23)\par  CBC, ESR, CRP, CMP - all WNL\par  quantitative IG - WNL\par  NICK, SSA/SSB, CCP AB - nl\par  TSH - nl
[FreeTextEntry1] : labs from PMD:  (1/21/23)\par  CBC, ESR, CRP, CMP - all WNL\par  quantitative IG - WNL\par  NICK, SSA/SSB, CCP AB - nl\par  TSH - nl
Statement Selected

## 2024-09-26 ENCOUNTER — APPOINTMENT (OUTPATIENT)
Dept: OBGYN | Facility: CLINIC | Age: 24
End: 2024-09-26
Payer: MEDICAID

## 2024-09-26 VITALS
SYSTOLIC BLOOD PRESSURE: 107 MMHG | HEIGHT: 61 IN | WEIGHT: 131 LBS | BODY MASS INDEX: 24.73 KG/M2 | DIASTOLIC BLOOD PRESSURE: 66 MMHG | HEART RATE: 73 BPM

## 2024-09-26 DIAGNOSIS — Z34.90 ENCOUNTER FOR SUPERVISION OF NORMAL PREGNANCY, UNSPECIFIED, UNSPECIFIED TRIMESTER: ICD-10-CM

## 2024-09-26 LAB
BILIRUB UR QL STRIP: NORMAL
GLUCOSE UR-MCNC: NORMAL
HCG UR QL: 0.2 EU/DL
HCG UR QL: POSITIVE
HGB UR QL STRIP.AUTO: NORMAL
KETONES UR-MCNC: NORMAL
LEUKOCYTE ESTERASE UR QL STRIP: NORMAL
NITRITE UR QL STRIP: NORMAL
PH UR STRIP: 6.5
PROT UR STRIP-MCNC: NORMAL
SP GR UR STRIP: >=1.03

## 2024-09-26 PROCEDURE — 81025 URINE PREGNANCY TEST: CPT

## 2024-09-26 PROCEDURE — 81003 URINALYSIS AUTO W/O SCOPE: CPT | Mod: QW

## 2024-09-26 PROCEDURE — 99213 OFFICE O/P EST LOW 20 MIN: CPT | Mod: TH,25

## 2024-09-26 PROCEDURE — 76815 OB US LIMITED FETUS(S): CPT

## 2024-09-27 LAB
ABO + RH PNL BLD: NORMAL
BASOPHILS # BLD AUTO: 0.04 K/UL
BASOPHILS NFR BLD AUTO: 0.6 %
BLD GP AB SCN SERPL QL: NORMAL
EOSINOPHIL # BLD AUTO: 0.04 K/UL
EOSINOPHIL NFR BLD AUTO: 0.6 %
HBV SURFACE AG SER QL: NONREACTIVE
HCT VFR BLD CALC: 35.2 %
HCV AB SER QL: NONREACTIVE
HCV S/CO RATIO: 0.1 S/CO
HGB BLD-MCNC: 11.3 G/DL
HIV1+2 AB SPEC QL IA.RAPID: NONREACTIVE
IMM GRANULOCYTES NFR BLD AUTO: 0.3 %
LYMPHOCYTES # BLD AUTO: 2.04 K/UL
LYMPHOCYTES NFR BLD AUTO: 29.4 %
MAN DIFF?: NORMAL
MCHC RBC-ENTMCNC: 31.3 PG
MCHC RBC-ENTMCNC: 32.1 GM/DL
MCV RBC AUTO: 97.5 FL
MEV IGG FLD QL IA: 6.76 AU/ML
MEV IGG+IGM SER-IMP: NEGATIVE
MONOCYTES # BLD AUTO: 0.41 K/UL
MONOCYTES NFR BLD AUTO: 5.9 %
MUV AB SER-ACNC: POSITIVE
MUV IGG SER QL IA: 75.3 AU/ML
NEUTROPHILS # BLD AUTO: 4.38 K/UL
NEUTROPHILS NFR BLD AUTO: 63.2 %
PLATELET # BLD AUTO: 206 K/UL
RBC # BLD: 3.61 M/UL
RBC # FLD: 12.1 %
RUBV IGG FLD-ACNC: 5.7 INDEX
RUBV IGG SER-IMP: POSITIVE
T PALLIDUM AB SER QL IA: NEGATIVE
VZV AB TITR SER: POSITIVE
VZV IGG SER IF-ACNC: 2.11 S/CO
WBC # FLD AUTO: 6.93 K/UL

## 2024-09-28 LAB
BACTERIA UR CULT: NORMAL
LEAD BLD-MCNC: <1 UG/DL

## 2024-10-01 LAB
B19V IGG SER QL IA: 4.83 INDEX
B19V IGG+IGM SER-IMP: NORMAL
B19V IGG+IGM SER-IMP: POSITIVE
B19V IGM FLD-ACNC: 0.22 INDEX
B19V IGM SER-ACNC: NEGATIVE

## 2024-10-07 ENCOUNTER — APPOINTMENT (OUTPATIENT)
Dept: ULTRASOUND IMAGING | Facility: CLINIC | Age: 24
End: 2024-10-07

## 2024-10-14 ENCOUNTER — LABORATORY RESULT (OUTPATIENT)
Age: 24
End: 2024-10-14

## 2024-10-14 ENCOUNTER — NON-APPOINTMENT (OUTPATIENT)
Age: 24
End: 2024-10-14

## 2024-10-14 ENCOUNTER — APPOINTMENT (OUTPATIENT)
Dept: ANTEPARTUM | Facility: CLINIC | Age: 24
End: 2024-10-14
Payer: MEDICAID

## 2024-10-14 ENCOUNTER — APPOINTMENT (OUTPATIENT)
Dept: OBGYN | Facility: CLINIC | Age: 24
End: 2024-10-14
Payer: MEDICAID

## 2024-10-14 ENCOUNTER — ASOB RESULT (OUTPATIENT)
Age: 24
End: 2024-10-14

## 2024-10-14 VITALS
BODY MASS INDEX: 25.32 KG/M2 | SYSTOLIC BLOOD PRESSURE: 100 MMHG | DIASTOLIC BLOOD PRESSURE: 67 MMHG | HEART RATE: 76 BPM | WEIGHT: 134 LBS

## 2024-10-14 DIAGNOSIS — Z34.90 ENCOUNTER FOR SUPERVISION OF NORMAL PREGNANCY, UNSPECIFIED, UNSPECIFIED TRIMESTER: ICD-10-CM

## 2024-10-14 LAB
BILIRUB UR QL STRIP: NORMAL
GLUCOSE UR-MCNC: NORMAL
HCG UR QL: 0.2 EU/DL
HGB UR QL STRIP.AUTO: NORMAL
KETONES UR-MCNC: NORMAL
LEUKOCYTE ESTERASE UR QL STRIP: NORMAL
NITRITE UR QL STRIP: NORMAL
PH UR STRIP: 7
PROT UR STRIP-MCNC: NORMAL
SP GR UR STRIP: 1.02

## 2024-10-14 PROCEDURE — 76811 OB US DETAILED SNGL FETUS: CPT

## 2024-10-14 PROCEDURE — 81003 URINALYSIS AUTO W/O SCOPE: CPT | Mod: QW

## 2024-10-14 PROCEDURE — 99213 OFFICE O/P EST LOW 20 MIN: CPT | Mod: TH,25

## 2024-10-15 LAB
BASOPHILS # BLD AUTO: 0.03 K/UL
BASOPHILS NFR BLD AUTO: 0.5 %
EOSINOPHIL # BLD AUTO: 0.06 K/UL
EOSINOPHIL NFR BLD AUTO: 0.9 %
GLUCOSE 1H P 50 G GLC PO SERPL-MCNC: 75 MG/DL
HCT VFR BLD CALC: 37.9 %
HGB BLD-MCNC: 11.6 G/DL
IMM GRANULOCYTES NFR BLD AUTO: 0.3 %
LYMPHOCYTES # BLD AUTO: 1.99 K/UL
LYMPHOCYTES NFR BLD AUTO: 30.5 %
MAN DIFF?: NORMAL
MCHC RBC-ENTMCNC: 30.6 GM/DL
MCHC RBC-ENTMCNC: 32 PG
MCV RBC AUTO: 104.4 FL
MONOCYTES # BLD AUTO: 0.48 K/UL
MONOCYTES NFR BLD AUTO: 7.4 %
NEUTROPHILS # BLD AUTO: 3.94 K/UL
NEUTROPHILS NFR BLD AUTO: 60.4 %
PLATELET # BLD AUTO: 138 K/UL
RBC # BLD: 3.63 M/UL
RBC # FLD: 12.7 %
WBC # FLD AUTO: 6.52 K/UL

## 2024-10-16 ENCOUNTER — NON-APPOINTMENT (OUTPATIENT)
Age: 24
End: 2024-10-16

## 2024-11-13 ENCOUNTER — LABORATORY RESULT (OUTPATIENT)
Age: 24
End: 2024-11-13

## 2024-11-13 ENCOUNTER — APPOINTMENT (OUTPATIENT)
Dept: OBGYN | Facility: CLINIC | Age: 24
End: 2024-11-13
Payer: MEDICAID

## 2024-11-13 VITALS
HEART RATE: 84 BPM | BODY MASS INDEX: 26.62 KG/M2 | HEIGHT: 61 IN | DIASTOLIC BLOOD PRESSURE: 72 MMHG | SYSTOLIC BLOOD PRESSURE: 110 MMHG | WEIGHT: 141 LBS

## 2024-11-13 DIAGNOSIS — Z34.90 ENCOUNTER FOR SUPERVISION OF NORMAL PREGNANCY, UNSPECIFIED, UNSPECIFIED TRIMESTER: ICD-10-CM

## 2024-11-13 PROCEDURE — 81003 URINALYSIS AUTO W/O SCOPE: CPT | Mod: QW

## 2024-11-13 PROCEDURE — 99213 OFFICE O/P EST LOW 20 MIN: CPT | Mod: TH,25

## 2024-11-14 ENCOUNTER — NON-APPOINTMENT (OUTPATIENT)
Age: 24
End: 2024-11-14

## 2024-11-14 LAB
BASOPHILS # BLD AUTO: 0.03 K/UL
BASOPHILS NFR BLD AUTO: 0.4 %
EOSINOPHIL # BLD AUTO: 0.04 K/UL
EOSINOPHIL NFR BLD AUTO: 0.6 %
GLUCOSE 1H P 50 G GLC PO SERPL-MCNC: 88 MG/DL
HCT VFR BLD CALC: 36.3 %
HGB BLD-MCNC: 11.4 G/DL
IMM GRANULOCYTES NFR BLD AUTO: 0.3 %
LYMPHOCYTES # BLD AUTO: 1.85 K/UL
LYMPHOCYTES NFR BLD AUTO: 26.1 %
MAN DIFF?: NORMAL
MCHC RBC-ENTMCNC: 31.4 G/DL
MCHC RBC-ENTMCNC: 31.8 PG
MCV RBC AUTO: 101.4 FL
MONOCYTES # BLD AUTO: 0.47 K/UL
MONOCYTES NFR BLD AUTO: 6.6 %
NEUTROPHILS # BLD AUTO: 4.68 K/UL
NEUTROPHILS NFR BLD AUTO: 66 %
PLATELET # BLD AUTO: 190 K/UL
RBC # BLD: 3.58 M/UL
RBC # FLD: 11.9 %
WBC # FLD AUTO: 7.09 K/UL

## 2024-12-04 ENCOUNTER — RESULT CHARGE (OUTPATIENT)
Age: 24
End: 2024-12-04

## 2024-12-04 ENCOUNTER — APPOINTMENT (OUTPATIENT)
Dept: OBGYN | Facility: CLINIC | Age: 24
End: 2024-12-04
Payer: MEDICAID

## 2024-12-04 VITALS
HEART RATE: 88 BPM | BODY MASS INDEX: 27.78 KG/M2 | SYSTOLIC BLOOD PRESSURE: 113 MMHG | DIASTOLIC BLOOD PRESSURE: 74 MMHG | WEIGHT: 147 LBS

## 2024-12-04 DIAGNOSIS — Z34.90 ENCOUNTER FOR SUPERVISION OF NORMAL PREGNANCY, UNSPECIFIED, UNSPECIFIED TRIMESTER: ICD-10-CM

## 2024-12-04 LAB
BILIRUB UR QL STRIP: NORMAL
GLUCOSE UR-MCNC: NORMAL
HCG UR QL: 0.2 EU/DL
HGB UR QL STRIP.AUTO: NORMAL
KETONES UR-MCNC: NORMAL
LEUKOCYTE ESTERASE UR QL STRIP: NORMAL
NITRITE UR QL STRIP: NORMAL
PH UR STRIP: 7.5
PROT UR STRIP-MCNC: NORMAL
SP GR UR STRIP: 1.01

## 2024-12-04 PROCEDURE — 76816 OB US FOLLOW-UP PER FETUS: CPT

## 2024-12-04 PROCEDURE — 99213 OFFICE O/P EST LOW 20 MIN: CPT | Mod: TH,25

## 2024-12-04 PROCEDURE — 59025 FETAL NON-STRESS TEST: CPT | Mod: 59

## 2024-12-04 PROCEDURE — 81003 URINALYSIS AUTO W/O SCOPE: CPT | Mod: QW

## 2025-01-10 ENCOUNTER — NON-APPOINTMENT (OUTPATIENT)
Age: 25
End: 2025-01-10

## 2025-01-13 ENCOUNTER — APPOINTMENT (OUTPATIENT)
Dept: OBGYN | Facility: CLINIC | Age: 25
End: 2025-01-13
Payer: MEDICAID

## 2025-01-13 VITALS
SYSTOLIC BLOOD PRESSURE: 117 MMHG | BODY MASS INDEX: 28.32 KG/M2 | HEIGHT: 61 IN | HEART RATE: 82 BPM | DIASTOLIC BLOOD PRESSURE: 58 MMHG | WEIGHT: 150 LBS

## 2025-01-13 DIAGNOSIS — Z34.90 ENCOUNTER FOR SUPERVISION OF NORMAL PREGNANCY, UNSPECIFIED, UNSPECIFIED TRIMESTER: ICD-10-CM

## 2025-01-13 LAB
BILIRUB UR QL STRIP: NORMAL
GLUCOSE UR-MCNC: NORMAL
HCG UR QL: 0.2 EU/DL
HGB UR QL STRIP.AUTO: NORMAL
KETONES UR-MCNC: NORMAL
LEUKOCYTE ESTERASE UR QL STRIP: NORMAL
NITRITE UR QL STRIP: NORMAL
PH UR STRIP: 8.5
PROT UR STRIP-MCNC: NORMAL
SP GR UR STRIP: 1.02

## 2025-01-13 PROCEDURE — 81003 URINALYSIS AUTO W/O SCOPE: CPT | Mod: QW

## 2025-01-13 PROCEDURE — 99213 OFFICE O/P EST LOW 20 MIN: CPT | Mod: TH,25

## 2025-01-27 ENCOUNTER — NON-APPOINTMENT (OUTPATIENT)
Age: 25
End: 2025-01-27

## 2025-01-27 ENCOUNTER — APPOINTMENT (OUTPATIENT)
Dept: OBGYN | Facility: CLINIC | Age: 25
End: 2025-01-27
Payer: MEDICAID

## 2025-01-27 VITALS
HEART RATE: 87 BPM | DIASTOLIC BLOOD PRESSURE: 74 MMHG | BODY MASS INDEX: 28.51 KG/M2 | HEIGHT: 61 IN | WEIGHT: 151 LBS | SYSTOLIC BLOOD PRESSURE: 113 MMHG

## 2025-01-27 PROCEDURE — 76816 OB US FOLLOW-UP PER FETUS: CPT

## 2025-01-27 PROCEDURE — 99213 OFFICE O/P EST LOW 20 MIN: CPT | Mod: TH,25

## 2025-01-27 PROCEDURE — 76819 FETAL BIOPHYS PROFIL W/O NST: CPT | Mod: 59

## 2025-01-27 PROCEDURE — 81003 URINALYSIS AUTO W/O SCOPE: CPT | Mod: QW

## 2025-01-30 ENCOUNTER — NON-APPOINTMENT (OUTPATIENT)
Age: 25
End: 2025-01-30

## 2025-02-03 ENCOUNTER — APPOINTMENT (OUTPATIENT)
Dept: OBGYN | Facility: CLINIC | Age: 25
End: 2025-02-03
Payer: MEDICAID

## 2025-02-03 VITALS
WEIGHT: 153 LBS | HEART RATE: 84 BPM | BODY MASS INDEX: 28.89 KG/M2 | SYSTOLIC BLOOD PRESSURE: 104 MMHG | DIASTOLIC BLOOD PRESSURE: 70 MMHG | HEIGHT: 61 IN

## 2025-02-03 DIAGNOSIS — Z34.90 ENCOUNTER FOR SUPERVISION OF NORMAL PREGNANCY, UNSPECIFIED, UNSPECIFIED TRIMESTER: ICD-10-CM

## 2025-02-03 PROCEDURE — 81003 URINALYSIS AUTO W/O SCOPE: CPT | Mod: QW

## 2025-02-03 PROCEDURE — 99213 OFFICE O/P EST LOW 20 MIN: CPT | Mod: TH,25

## 2025-02-10 ENCOUNTER — APPOINTMENT (OUTPATIENT)
Dept: OBGYN | Facility: CLINIC | Age: 25
End: 2025-02-10
Payer: MEDICAID

## 2025-02-10 VITALS
SYSTOLIC BLOOD PRESSURE: 112 MMHG | WEIGHT: 152 LBS | HEART RATE: 87 BPM | DIASTOLIC BLOOD PRESSURE: 73 MMHG | BODY MASS INDEX: 28.72 KG/M2

## 2025-02-10 LAB
BILIRUB UR QL STRIP: NORMAL
GLUCOSE UR-MCNC: NORMAL
HCG UR QL: 1 EU/DL
HGB UR QL STRIP.AUTO: NORMAL
KETONES UR-MCNC: NORMAL
LEUKOCYTE ESTERASE UR QL STRIP: NORMAL
NITRITE UR QL STRIP: NORMAL
PH UR STRIP: 7
PROT UR STRIP-MCNC: NORMAL
SP GR UR STRIP: 1.02

## 2025-02-10 PROCEDURE — 81003 URINALYSIS AUTO W/O SCOPE: CPT | Mod: QW

## 2025-02-10 PROCEDURE — 99213 OFFICE O/P EST LOW 20 MIN: CPT | Mod: TH,25

## 2025-02-13 ENCOUNTER — NON-APPOINTMENT (OUTPATIENT)
Age: 25
End: 2025-02-13

## 2025-02-13 ENCOUNTER — APPOINTMENT (OUTPATIENT)
Dept: OBGYN | Facility: CLINIC | Age: 25
End: 2025-02-13

## 2025-02-17 ENCOUNTER — NON-APPOINTMENT (OUTPATIENT)
Age: 25
End: 2025-02-17

## 2025-02-22 ENCOUNTER — NON-APPOINTMENT (OUTPATIENT)
Age: 25
End: 2025-02-22

## 2025-02-24 ENCOUNTER — TRANSCRIPTION ENCOUNTER (OUTPATIENT)
Age: 25
End: 2025-02-24

## 2025-04-08 NOTE — DISCHARGE NOTE OB - TEMPERATURE GREATER THAN 100.0  F ORALLY
oxyCODONE-acetaminophen (PERCOCET) 5-325 MG per tablet       [id number] - [place] - [address] AT [address] OF [facility] [address]  [address]  [place] [address]  Phone: [phone] Fax: [phone]       Statement Selected